# Patient Record
Sex: FEMALE | Race: WHITE | NOT HISPANIC OR LATINO | Employment: OTHER | URBAN - NONMETROPOLITAN AREA
[De-identification: names, ages, dates, MRNs, and addresses within clinical notes are randomized per-mention and may not be internally consistent; named-entity substitution may affect disease eponyms.]

---

## 2017-10-27 ENCOUNTER — TRANSCRIBE ORDERS (OUTPATIENT)
Dept: ADMINISTRATIVE | Facility: HOSPITAL | Age: 62
End: 2017-10-27

## 2017-10-27 DIAGNOSIS — Z12.39 SCREENING BREAST EXAMINATION: Primary | ICD-10-CM

## 2017-11-21 ENCOUNTER — HOSPITAL ENCOUNTER (OUTPATIENT)
Dept: MAMMOGRAPHY | Facility: HOSPITAL | Age: 62
Discharge: HOME | End: 2017-11-21
Admitting: ADVANCED PRACTICE MIDWIFE

## 2017-11-21 DIAGNOSIS — Z12.39 SCREENING BREAST EXAMINATION: ICD-10-CM

## 2017-11-21 PROCEDURE — 77063 BREAST TOMOSYNTHESIS BI: CPT | Performed by: RADIOLOGY

## 2017-11-21 PROCEDURE — 77063 BREAST TOMOSYNTHESIS BI: CPT

## 2017-11-21 PROCEDURE — 77067 SCR MAMMO BI INCL CAD: CPT | Performed by: RADIOLOGY

## 2017-11-21 PROCEDURE — G0202 SCR MAMMO BI INCL CAD: HCPCS

## 2018-06-13 ENCOUNTER — TRANSCRIBE ORDERS (OUTPATIENT)
Dept: ADMINISTRATIVE | Facility: HOSPITAL | Age: 63
End: 2018-06-13

## 2018-06-13 DIAGNOSIS — R60.0 EDEMA EXTREMITIES: Primary | ICD-10-CM

## 2018-06-19 ENCOUNTER — HOSPITAL ENCOUNTER (OUTPATIENT)
Dept: CARDIOLOGY | Facility: HOSPITAL | Age: 63
Discharge: HOME | End: 2018-06-19
Admitting: NURSE PRACTITIONER

## 2018-06-19 DIAGNOSIS — R60.0 EDEMA EXTREMITIES: ICD-10-CM

## 2018-06-19 PROCEDURE — 93925 LOWER EXTREMITY STUDY: CPT | Performed by: RADIOLOGY

## 2018-06-19 PROCEDURE — 93923 UPR/LXTR ART STDY 3+ LVLS: CPT

## 2019-01-11 ENCOUNTER — HOSPITAL ENCOUNTER (OUTPATIENT)
Dept: BONE DENSITY | Facility: HOSPITAL | Age: 64
Discharge: HOME | End: 2019-01-11
Admitting: ADVANCED PRACTICE MIDWIFE

## 2019-01-11 ENCOUNTER — HOSPITAL ENCOUNTER (OUTPATIENT)
Dept: MAMMOGRAPHY | Facility: HOSPITAL | Age: 64
Discharge: HOME | End: 2019-01-11

## 2019-01-11 DIAGNOSIS — Z12.39 SCREENING BREAST EXAMINATION: ICD-10-CM

## 2019-01-11 DIAGNOSIS — Z01.419 WELL WOMAN EXAM: ICD-10-CM

## 2019-01-11 PROCEDURE — 77067 SCR MAMMO BI INCL CAD: CPT

## 2019-01-11 PROCEDURE — 77063 BREAST TOMOSYNTHESIS BI: CPT

## 2019-01-11 PROCEDURE — 77063 BREAST TOMOSYNTHESIS BI: CPT | Performed by: RADIOLOGY

## 2019-01-11 PROCEDURE — 77080 DXA BONE DENSITY AXIAL: CPT | Performed by: RADIOLOGY

## 2019-01-11 PROCEDURE — 77067 SCR MAMMO BI INCL CAD: CPT | Performed by: RADIOLOGY

## 2019-01-11 PROCEDURE — 77080 DXA BONE DENSITY AXIAL: CPT

## 2019-05-03 ENCOUNTER — APPOINTMENT (OUTPATIENT)
Dept: CT IMAGING | Facility: HOSPITAL | Age: 64
End: 2019-05-03

## 2019-05-03 ENCOUNTER — HOSPITAL ENCOUNTER (EMERGENCY)
Facility: HOSPITAL | Age: 64
Discharge: HOME | End: 2019-05-03
Attending: EMERGENCY MEDICINE | Admitting: EMERGENCY MEDICINE

## 2019-05-03 ENCOUNTER — APPOINTMENT (OUTPATIENT)
Dept: GENERAL RADIOLOGY | Facility: HOSPITAL | Age: 64
End: 2019-05-03

## 2019-05-03 VITALS
OXYGEN SATURATION: 99 % | HEIGHT: 60 IN | TEMPERATURE: 98.9 F | BODY MASS INDEX: 40.25 KG/M2 | HEART RATE: 74 BPM | SYSTOLIC BLOOD PRESSURE: 154 MMHG | DIASTOLIC BLOOD PRESSURE: 63 MMHG | RESPIRATION RATE: 16 BRPM | WEIGHT: 205 LBS

## 2019-05-03 DIAGNOSIS — I10 ESSENTIAL HYPERTENSION: Primary | ICD-10-CM

## 2019-05-03 LAB
ALBUMIN SERPL-MCNC: 4.2 G/DL (ref 3.5–5.2)
ALBUMIN/GLOB SERPL: 1.2 G/DL
ALP SERPL-CCNC: 84 U/L (ref 39–117)
ALT SERPL W P-5'-P-CCNC: 24 U/L (ref 1–33)
ANION GAP SERPL CALCULATED.3IONS-SCNC: 16.9 MMOL/L
AST SERPL-CCNC: 20 U/L (ref 1–32)
BASOPHILS # BLD AUTO: 0.03 10*3/MM3 (ref 0–0.2)
BASOPHILS NFR BLD AUTO: 0.3 % (ref 0–1.5)
BILIRUB SERPL-MCNC: 0.2 MG/DL (ref 0.2–1.2)
BUN BLD-MCNC: 11 MG/DL (ref 8–23)
BUN/CREAT SERPL: 16.4 (ref 7–25)
CALCIUM SPEC-SCNC: 9.2 MG/DL (ref 8.6–10.5)
CHLORIDE SERPL-SCNC: 99 MMOL/L (ref 98–107)
CO2 SERPL-SCNC: 25.1 MMOL/L (ref 22–29)
CREAT BLD-MCNC: 0.67 MG/DL (ref 0.57–1)
DEPRECATED RDW RBC AUTO: 50.7 FL (ref 37–54)
EOSINOPHIL # BLD AUTO: 0.22 10*3/MM3 (ref 0–0.4)
EOSINOPHIL NFR BLD AUTO: 1.9 % (ref 0.3–6.2)
ERYTHROCYTE [DISTWIDTH] IN BLOOD BY AUTOMATED COUNT: 15.9 % (ref 12.3–15.4)
GFR SERPL CREATININE-BSD FRML MDRD: 89 ML/MIN/1.73
GLOBULIN UR ELPH-MCNC: 3.4 GM/DL
GLUCOSE BLD-MCNC: 128 MG/DL (ref 65–99)
HCT VFR BLD AUTO: 39.8 % (ref 34–46.6)
HGB BLD-MCNC: 12.1 G/DL (ref 12–15.9)
HOLD SPECIMEN: NORMAL
HOLD SPECIMEN: NORMAL
IMM GRANULOCYTES # BLD AUTO: 0.03 10*3/MM3 (ref 0–0.05)
IMM GRANULOCYTES NFR BLD AUTO: 0.3 % (ref 0–0.5)
LYMPHOCYTES # BLD AUTO: 1.26 10*3/MM3 (ref 0.7–3.1)
LYMPHOCYTES NFR BLD AUTO: 11.1 % (ref 19.6–45.3)
MCH RBC QN AUTO: 26.9 PG (ref 26.6–33)
MCHC RBC AUTO-ENTMCNC: 30.4 G/DL (ref 31.5–35.7)
MCV RBC AUTO: 88.6 FL (ref 79–97)
MONOCYTES # BLD AUTO: 0.77 10*3/MM3 (ref 0.1–0.9)
MONOCYTES NFR BLD AUTO: 6.8 % (ref 5–12)
NEUTROPHILS # BLD AUTO: 9.03 10*3/MM3 (ref 1.7–7)
NEUTROPHILS NFR BLD AUTO: 79.6 % (ref 42.7–76)
PLATELET # BLD AUTO: 383 10*3/MM3 (ref 140–450)
PMV BLD AUTO: 8.9 FL (ref 6–12)
POTASSIUM BLD-SCNC: 3.6 MMOL/L (ref 3.5–5.2)
PROT SERPL-MCNC: 7.6 G/DL (ref 6–8.5)
RBC # BLD AUTO: 4.49 10*6/MM3 (ref 3.77–5.28)
SODIUM BLD-SCNC: 141 MMOL/L (ref 136–145)
WBC NRBC COR # BLD: 11.34 10*3/MM3 (ref 3.4–10.8)
WHOLE BLOOD HOLD SPECIMEN: NORMAL
WHOLE BLOOD HOLD SPECIMEN: NORMAL

## 2019-05-03 PROCEDURE — 80053 COMPREHEN METABOLIC PANEL: CPT | Performed by: EMERGENCY MEDICINE

## 2019-05-03 PROCEDURE — 93005 ELECTROCARDIOGRAM TRACING: CPT | Performed by: EMERGENCY MEDICINE

## 2019-05-03 PROCEDURE — 94799 UNLISTED PULMONARY SVC/PX: CPT

## 2019-05-03 PROCEDURE — 25010000002 DEXAMETHASONE PER 1 MG: Performed by: EMERGENCY MEDICINE

## 2019-05-03 PROCEDURE — 71046 X-RAY EXAM CHEST 2 VIEWS: CPT

## 2019-05-03 PROCEDURE — 71260 CT THORAX DX C+: CPT

## 2019-05-03 PROCEDURE — 71046 X-RAY EXAM CHEST 2 VIEWS: CPT | Performed by: RADIOLOGY

## 2019-05-03 PROCEDURE — 94640 AIRWAY INHALATION TREATMENT: CPT

## 2019-05-03 PROCEDURE — 0 IOVERSOL 68 % SOLUTION: Performed by: EMERGENCY MEDICINE

## 2019-05-03 PROCEDURE — 85025 COMPLETE CBC W/AUTO DIFF WBC: CPT | Performed by: EMERGENCY MEDICINE

## 2019-05-03 PROCEDURE — 93010 ELECTROCARDIOGRAM REPORT: CPT | Performed by: INTERNAL MEDICINE

## 2019-05-03 PROCEDURE — 99283 EMERGENCY DEPT VISIT LOW MDM: CPT

## 2019-05-03 PROCEDURE — 96374 THER/PROPH/DIAG INJ IV PUSH: CPT

## 2019-05-03 PROCEDURE — 71260 CT THORAX DX C+: CPT | Performed by: RADIOLOGY

## 2019-05-03 RX ORDER — DEXLANSOPRAZOLE 60 MG/1
60 CAPSULE, DELAYED RELEASE ORAL DAILY
COMMUNITY
End: 2023-04-03 | Stop reason: SDUPTHER

## 2019-05-03 RX ORDER — DEXAMETHASONE SODIUM PHOSPHATE 4 MG/ML
4 INJECTION, SOLUTION INTRA-ARTICULAR; INTRALESIONAL; INTRAMUSCULAR; INTRAVENOUS; SOFT TISSUE ONCE
Status: COMPLETED | OUTPATIENT
Start: 2019-05-03 | End: 2019-05-03

## 2019-05-03 RX ORDER — ALBUTEROL SULFATE 2.5 MG/3ML
2.5 SOLUTION RESPIRATORY (INHALATION) ONCE
Status: COMPLETED | OUTPATIENT
Start: 2019-05-03 | End: 2019-05-03

## 2019-05-03 RX ORDER — ASPIRIN 81 MG/1
81 TABLET, CHEWABLE ORAL DAILY
COMMUNITY

## 2019-05-03 RX ORDER — BISOPROLOL FUMARATE AND HYDROCHLOROTHIAZIDE 5; 6.25 MG/1; MG/1
1 TABLET ORAL DAILY
COMMUNITY
End: 2021-04-14

## 2019-05-03 RX ADMIN — DEXAMETHASONE SODIUM PHOSPHATE 4 MG: 4 INJECTION, SOLUTION INTRA-ARTICULAR; INTRALESIONAL; INTRAMUSCULAR; INTRAVENOUS; SOFT TISSUE at 18:39

## 2019-05-03 RX ADMIN — IOVERSOL 90 ML: 678 INJECTION INTRA-ARTERIAL; INTRAVENOUS at 21:05

## 2019-05-03 RX ADMIN — ALBUTEROL SULFATE 2.5 MG: 2.5 SOLUTION RESPIRATORY (INHALATION) at 19:04

## 2019-05-04 NOTE — ED PROVIDER NOTES
Subjective   Patient presents to ER with shortness of breath        Shortness of Breath   Severity:  Mild  Onset quality:  Gradual  Timing:  Constant  Progression:  Worsening  Context: activity, pollens and weather changes    Relieved by:  Nothing  Worsened by:  Exertion and movement      Review of Systems   Constitutional: Positive for activity change and fatigue.   HENT: Negative.    Eyes: Negative.    Respiratory: Positive for shortness of breath.    Cardiovascular: Negative.    Gastrointestinal: Negative.    Endocrine: Negative.    Genitourinary: Negative.    Musculoskeletal: Negative.    Skin: Negative.    Allergic/Immunologic: Negative.    Neurological: Negative.    Hematological: Negative.    Psychiatric/Behavioral: Negative.        Past Medical History:   Diagnosis Date   • Asthma    • Hypertension        Allergies   Allergen Reactions   • Erythromycin GI Intolerance       Past Surgical History:   Procedure Laterality Date   • HYSTERECTOMY         Family History   Problem Relation Age of Onset   • Breast cancer Neg Hx        Social History     Socioeconomic History   • Marital status:      Spouse name: Not on file   • Number of children: Not on file   • Years of education: Not on file   • Highest education level: Not on file   Tobacco Use   • Smoking status: Former Smoker   • Smokeless tobacco: Never Used   Substance and Sexual Activity   • Alcohol use: No     Frequency: Never   • Drug use: No           Objective   Physical Exam   Constitutional: She appears well-developed and well-nourished.   HENT:   Head: Normocephalic and atraumatic.   Mouth/Throat: Oropharynx is clear and moist.   Eyes: Pupils are equal, round, and reactive to light.   Neck: Normal range of motion.   Cardiovascular: Normal rate.   Pulmonary/Chest: Effort normal and breath sounds normal.   Abdominal: Soft.   Musculoskeletal: Normal range of motion.        Right lower leg: Normal. She exhibits edema.        Left lower leg: Normal.  She exhibits edema.   Neurological: She is alert.   Skin: Skin is warm and dry. Capillary refill takes less than 2 seconds.   Psychiatric: She has a normal mood and affect.   Nursing note and vitals reviewed.      Procedures           ED Course  ED Course as of May 03 2218   Fri May 03, 2019   2216 CT Chest 1x1.5 cm nodule right thyroid- Patient advised to follow-up with PCP and consider ultrasound.Probable granuloma left lung base, Hiatal hernia.  [ZAIRA]      ED Course User Index  [ZAIRA] Vu Gallegos MD                  [facility]      Final diagnoses:   Essential hypertension            Vu Gallegos MD  05/03/19 2205       Vu Gallegos MD  05/03/19 2218

## 2019-05-14 ENCOUNTER — TRANSCRIBE ORDERS (OUTPATIENT)
Dept: ADMINISTRATIVE | Facility: HOSPITAL | Age: 64
End: 2019-05-14

## 2019-05-14 DIAGNOSIS — E04.1 THYROID NODULE: Primary | ICD-10-CM

## 2019-05-16 ENCOUNTER — HOSPITAL ENCOUNTER (OUTPATIENT)
Dept: ULTRASOUND IMAGING | Facility: HOSPITAL | Age: 64
Discharge: HOME | End: 2019-05-16
Admitting: NURSE PRACTITIONER

## 2019-05-16 DIAGNOSIS — E04.1 THYROID NODULE: ICD-10-CM

## 2019-05-16 PROCEDURE — 76536 US EXAM OF HEAD AND NECK: CPT | Performed by: RADIOLOGY

## 2019-05-16 PROCEDURE — 76536 US EXAM OF HEAD AND NECK: CPT

## 2019-11-13 ENCOUNTER — TRANSCRIBE ORDERS (OUTPATIENT)
Dept: OTHER | Facility: OTHER | Age: 64
End: 2019-11-13

## 2019-11-13 ENCOUNTER — HOSPITAL ENCOUNTER (OUTPATIENT)
Dept: CT IMAGING | Facility: HOSPITAL | Age: 64
Discharge: HOME | End: 2019-11-13
Admitting: NURSE PRACTITIONER

## 2019-11-13 ENCOUNTER — LAB (OUTPATIENT)
Dept: LAB | Facility: HOSPITAL | Age: 64
End: 2019-11-13

## 2019-11-13 DIAGNOSIS — F05 VASCULAR DEMENTIA WITH DELIRIUM (HCC): ICD-10-CM

## 2019-11-13 DIAGNOSIS — R11.0 NAUSEA: ICD-10-CM

## 2019-11-13 DIAGNOSIS — R42 DIZZINESS AND GIDDINESS: ICD-10-CM

## 2019-11-13 DIAGNOSIS — R41.0 CONFUSION: Primary | ICD-10-CM

## 2019-11-13 DIAGNOSIS — R11.0 NAUSEA: Primary | ICD-10-CM

## 2019-11-13 DIAGNOSIS — R79.89 POSITIVE D-DIMER: Primary | ICD-10-CM

## 2019-11-13 DIAGNOSIS — R41.0 CONFUSION: ICD-10-CM

## 2019-11-13 DIAGNOSIS — F01.50 VASCULAR DEMENTIA WITH DELIRIUM (HCC): ICD-10-CM

## 2019-11-13 LAB
ALBUMIN SERPL-MCNC: 4.2 G/DL (ref 3.5–5.2)
ALBUMIN/GLOB SERPL: 1.3 G/DL
ALP SERPL-CCNC: 83 U/L (ref 39–117)
ALT SERPL W P-5'-P-CCNC: 28 U/L (ref 1–33)
ANION GAP SERPL CALCULATED.3IONS-SCNC: 12.6 MMOL/L (ref 5–15)
AST SERPL-CCNC: 27 U/L (ref 1–32)
BASOPHILS # BLD AUTO: 0.03 10*3/MM3 (ref 0–0.2)
BASOPHILS NFR BLD AUTO: 0.4 % (ref 0–1.5)
BILIRUB SERPL-MCNC: 0.2 MG/DL (ref 0.2–1.2)
BUN BLD-MCNC: 9 MG/DL (ref 8–23)
BUN/CREAT SERPL: 16.1 (ref 7–25)
CALCIUM SPEC-SCNC: 9.3 MG/DL (ref 8.6–10.5)
CHLORIDE SERPL-SCNC: 100 MMOL/L (ref 98–107)
CHOLEST SERPL-MCNC: 171 MG/DL (ref 0–200)
CO2 SERPL-SCNC: 29.4 MMOL/L (ref 22–29)
CREAT BLD-MCNC: 0.56 MG/DL (ref 0.57–1)
D DIMER PPP FEU-MCNC: 0.51 MCGFEU/ML (ref 0–0.5)
DEPRECATED RDW RBC AUTO: 50.5 FL (ref 37–54)
EOSINOPHIL # BLD AUTO: 0.09 10*3/MM3 (ref 0–0.4)
EOSINOPHIL NFR BLD AUTO: 1.2 % (ref 0.3–6.2)
ERYTHROCYTE [DISTWIDTH] IN BLOOD BY AUTOMATED COUNT: 15.9 % (ref 12.3–15.4)
GFR SERPL CREATININE-BSD FRML MDRD: 109 ML/MIN/1.73
GLOBULIN UR ELPH-MCNC: 3.3 GM/DL
GLUCOSE BLD-MCNC: 140 MG/DL (ref 65–99)
HBA1C MFR BLD: 6.5 % (ref 4.8–5.6)
HCT VFR BLD AUTO: 38.5 % (ref 34–46.6)
HDLC SERPL-MCNC: 47 MG/DL (ref 40–60)
HGB BLD-MCNC: 11.8 G/DL (ref 12–15.9)
IMM GRANULOCYTES # BLD AUTO: 0.05 10*3/MM3 (ref 0–0.05)
IMM GRANULOCYTES NFR BLD AUTO: 0.7 % (ref 0–0.5)
LDLC SERPL CALC-MCNC: 101 MG/DL (ref 0–100)
LDLC/HDLC SERPL: 2.14 {RATIO}
LYMPHOCYTES # BLD AUTO: 0.87 10*3/MM3 (ref 0.7–3.1)
LYMPHOCYTES NFR BLD AUTO: 11.6 % (ref 19.6–45.3)
MAGNESIUM SERPL-MCNC: 1.7 MG/DL (ref 1.6–2.4)
MCH RBC QN AUTO: 26.5 PG (ref 26.6–33)
MCHC RBC AUTO-ENTMCNC: 30.6 G/DL (ref 31.5–35.7)
MCV RBC AUTO: 86.3 FL (ref 79–97)
MONOCYTES # BLD AUTO: 0.44 10*3/MM3 (ref 0.1–0.9)
MONOCYTES NFR BLD AUTO: 5.9 % (ref 5–12)
NEUTROPHILS # BLD AUTO: 6.03 10*3/MM3 (ref 1.7–7)
NEUTROPHILS NFR BLD AUTO: 80.2 % (ref 42.7–76)
NRBC BLD AUTO-RTO: 0 /100 WBC (ref 0–0.2)
PLATELET # BLD AUTO: 302 10*3/MM3 (ref 140–450)
PMV BLD AUTO: 8.9 FL (ref 6–12)
POTASSIUM BLD-SCNC: 3.2 MMOL/L (ref 3.5–5.2)
PROT SERPL-MCNC: 7.5 G/DL (ref 6–8.5)
RBC # BLD AUTO: 4.46 10*6/MM3 (ref 3.77–5.28)
SODIUM BLD-SCNC: 142 MMOL/L (ref 136–145)
TRIGL SERPL-MCNC: 116 MG/DL (ref 0–150)
TSH SERPL DL<=0.05 MIU/L-ACNC: 2.15 UIU/ML (ref 0.27–4.2)
VLDLC SERPL-MCNC: 23.2 MG/DL
WBC NRBC COR # BLD: 7.51 10*3/MM3 (ref 3.4–10.8)

## 2019-11-13 PROCEDURE — 83036 HEMOGLOBIN GLYCOSYLATED A1C: CPT

## 2019-11-13 PROCEDURE — 80061 LIPID PANEL: CPT

## 2019-11-13 PROCEDURE — 70450 CT HEAD/BRAIN W/O DYE: CPT

## 2019-11-13 PROCEDURE — 85379 FIBRIN DEGRADATION QUANT: CPT

## 2019-11-13 PROCEDURE — 70450 CT HEAD/BRAIN W/O DYE: CPT | Performed by: RADIOLOGY

## 2019-11-13 PROCEDURE — 83735 ASSAY OF MAGNESIUM: CPT

## 2019-11-13 PROCEDURE — 84480 ASSAY TRIIODOTHYRONINE (T3): CPT

## 2019-11-13 PROCEDURE — 85025 COMPLETE CBC W/AUTO DIFF WBC: CPT

## 2019-11-13 PROCEDURE — 84436 ASSAY OF TOTAL THYROXINE: CPT

## 2019-11-13 PROCEDURE — 36415 COLL VENOUS BLD VENIPUNCTURE: CPT

## 2019-11-13 PROCEDURE — 84443 ASSAY THYROID STIM HORMONE: CPT

## 2019-11-13 PROCEDURE — 80053 COMPREHEN METABOLIC PANEL: CPT

## 2019-11-14 ENCOUNTER — HOSPITAL ENCOUNTER (OUTPATIENT)
Dept: CT IMAGING | Facility: HOSPITAL | Age: 64
Discharge: HOME | End: 2019-11-14
Admitting: NURSE PRACTITIONER

## 2019-11-14 DIAGNOSIS — R79.89 POSITIVE D-DIMER: ICD-10-CM

## 2019-11-14 LAB
T3 SERPL-MCNC: 121 NG/DL (ref 80–200)
T4 SERPL-MCNC: 7.54 MCG/DL (ref 4.5–11.7)

## 2019-11-14 PROCEDURE — 71275 CT ANGIOGRAPHY CHEST: CPT

## 2019-11-14 PROCEDURE — 71275 CT ANGIOGRAPHY CHEST: CPT | Performed by: RADIOLOGY

## 2019-11-14 PROCEDURE — 0 IOVERSOL 74 % SOLUTION: Performed by: NURSE PRACTITIONER

## 2019-11-14 RX ADMIN — IOVERSOL 100 ML: 741 INJECTION INTRA-ARTERIAL; INTRAVENOUS at 09:38

## 2019-11-29 ENCOUNTER — TRANSCRIBE ORDERS (OUTPATIENT)
Dept: OTHER | Facility: OTHER | Age: 64
End: 2019-11-29

## 2019-11-29 ENCOUNTER — TRANSCRIBE ORDERS (OUTPATIENT)
Dept: ADMINISTRATIVE | Facility: HOSPITAL | Age: 64
End: 2019-11-29

## 2019-11-29 DIAGNOSIS — R42 DIZZINESS: Primary | ICD-10-CM

## 2019-11-29 DIAGNOSIS — G45.9 TIA (TRANSIENT ISCHEMIC ATTACK): ICD-10-CM

## 2019-12-06 ENCOUNTER — HOSPITAL ENCOUNTER (OUTPATIENT)
Dept: CARDIOLOGY | Facility: HOSPITAL | Age: 64
Discharge: HOME | End: 2019-12-06

## 2019-12-06 ENCOUNTER — HOSPITAL ENCOUNTER (OUTPATIENT)
Dept: CARDIOLOGY | Facility: HOSPITAL | Age: 64
Discharge: HOME | End: 2019-12-06
Admitting: INTERNAL MEDICINE

## 2019-12-06 DIAGNOSIS — R42 DIZZINESS: ICD-10-CM

## 2019-12-06 DIAGNOSIS — G45.9 TIA (TRANSIENT ISCHEMIC ATTACK): ICD-10-CM

## 2019-12-06 LAB
BH CV ECHO MEAS - ACS: 1.8 CM
BH CV ECHO MEAS - AO MAX PG: 9.4 MMHG
BH CV ECHO MEAS - AO MEAN PG: 4.3 MMHG
BH CV ECHO MEAS - AO ROOT AREA (BSA CORRECTED): 1.6
BH CV ECHO MEAS - AO ROOT AREA: 6.8 CM^2
BH CV ECHO MEAS - AO ROOT DIAM: 2.9 CM
BH CV ECHO MEAS - AO V2 MAX: 153.4 CM/SEC
BH CV ECHO MEAS - AO V2 MEAN: 95 CM/SEC
BH CV ECHO MEAS - AO V2 VTI: 35.8 CM
BH CV ECHO MEAS - BSA(HAYCOCK): 2 M^2
BH CV ECHO MEAS - BSA: 1.9 M^2
BH CV ECHO MEAS - BZI_BMI: 40 KILOGRAMS/M^2
BH CV ECHO MEAS - BZI_METRIC_HEIGHT: 152.4 CM
BH CV ECHO MEAS - BZI_METRIC_WEIGHT: 93 KG
BH CV ECHO MEAS - EDV(CUBED): 68.6 ML
BH CV ECHO MEAS - EDV(MOD-SP4): 56 ML
BH CV ECHO MEAS - EDV(TEICH): 73.9 ML
BH CV ECHO MEAS - EF(CUBED): 73.2 %
BH CV ECHO MEAS - EF(MOD-SP4): 67.9 %
BH CV ECHO MEAS - EF(TEICH): 65.5 %
BH CV ECHO MEAS - ESV(CUBED): 18.4 ML
BH CV ECHO MEAS - ESV(MOD-SP4): 18 ML
BH CV ECHO MEAS - ESV(TEICH): 25.5 ML
BH CV ECHO MEAS - FS: 35.5 %
BH CV ECHO MEAS - IVS/LVPW: 1
BH CV ECHO MEAS - IVSD: 0.97 CM
BH CV ECHO MEAS - LA DIMENSION: 3.6 CM
BH CV ECHO MEAS - LA/AO: 1.2
BH CV ECHO MEAS - LV DIASTOLIC VOL/BSA (35-75): 29.7 ML/M^2
BH CV ECHO MEAS - LV MASS(C)D: 124.7 GRAMS
BH CV ECHO MEAS - LV MASS(C)DI: 66.1 GRAMS/M^2
BH CV ECHO MEAS - LV SYSTOLIC VOL/BSA (12-30): 9.5 ML/M^2
BH CV ECHO MEAS - LVIDD: 4.1 CM
BH CV ECHO MEAS - LVIDS: 2.6 CM
BH CV ECHO MEAS - LVLD AP4: 5.6 CM
BH CV ECHO MEAS - LVLS AP4: 4.6 CM
BH CV ECHO MEAS - LVOT AREA (M): 2.3 CM^2
BH CV ECHO MEAS - LVOT AREA: 2.2 CM^2
BH CV ECHO MEAS - LVOT DIAM: 1.7 CM
BH CV ECHO MEAS - LVPWD: 0.95 CM
BH CV ECHO MEAS - MV A MAX VEL: 103.7 CM/SEC
BH CV ECHO MEAS - MV E MAX VEL: 77.5 CM/SEC
BH CV ECHO MEAS - MV E/A: 0.75
BH CV ECHO MEAS - PA ACC SLOPE: 1371 CM/SEC^2
BH CV ECHO MEAS - PA ACC TIME: 0.1 SEC
BH CV ECHO MEAS - PA PR(ACCEL): 36.2 MMHG
BH CV ECHO MEAS - RAP SYSTOLE: 10 MMHG
BH CV ECHO MEAS - RVSP: 45.7 MMHG
BH CV ECHO MEAS - SI(AO): 129.6 ML/M^2
BH CV ECHO MEAS - SI(CUBED): 26.6 ML/M^2
BH CV ECHO MEAS - SI(MOD-SP4): 20.1 ML/M^2
BH CV ECHO MEAS - SI(TEICH): 25.7 ML/M^2
BH CV ECHO MEAS - SV(AO): 244.4 ML
BH CV ECHO MEAS - SV(CUBED): 50.2 ML
BH CV ECHO MEAS - SV(MOD-SP4): 38 ML
BH CV ECHO MEAS - SV(TEICH): 48.4 ML
BH CV ECHO MEAS - TR MAX VEL: 298.6 CM/SEC

## 2019-12-06 PROCEDURE — 93880 EXTRACRANIAL BILAT STUDY: CPT | Performed by: RADIOLOGY

## 2019-12-06 PROCEDURE — 93306 TTE W/DOPPLER COMPLETE: CPT | Performed by: INTERNAL MEDICINE

## 2019-12-06 PROCEDURE — 93306 TTE W/DOPPLER COMPLETE: CPT

## 2019-12-06 PROCEDURE — 93880 EXTRACRANIAL BILAT STUDY: CPT

## 2020-01-21 ENCOUNTER — APPOINTMENT (OUTPATIENT)
Dept: MAMMOGRAPHY | Facility: HOSPITAL | Age: 65
End: 2020-01-21

## 2020-02-13 ENCOUNTER — HOSPITAL ENCOUNTER (OUTPATIENT)
Dept: MAMMOGRAPHY | Facility: HOSPITAL | Age: 65
Discharge: HOME | End: 2020-02-13
Admitting: ADVANCED PRACTICE MIDWIFE

## 2020-02-13 DIAGNOSIS — Z12.31 VISIT FOR SCREENING MAMMOGRAM: ICD-10-CM

## 2020-02-13 PROCEDURE — 77063 BREAST TOMOSYNTHESIS BI: CPT

## 2020-02-13 PROCEDURE — 77067 SCR MAMMO BI INCL CAD: CPT | Performed by: RADIOLOGY

## 2020-02-13 PROCEDURE — 77067 SCR MAMMO BI INCL CAD: CPT

## 2020-02-13 PROCEDURE — 77063 BREAST TOMOSYNTHESIS BI: CPT | Performed by: RADIOLOGY

## 2020-03-20 ENCOUNTER — TRANSCRIBE ORDERS (OUTPATIENT)
Dept: ADMINISTRATIVE | Facility: HOSPITAL | Age: 65
End: 2020-03-20

## 2020-03-20 ENCOUNTER — LAB (OUTPATIENT)
Dept: LAB | Facility: HOSPITAL | Age: 65
End: 2020-03-20

## 2020-03-20 DIAGNOSIS — J40 BRONCHITIS, NOT SPECIFIED AS ACUTE OR CHRONIC: ICD-10-CM

## 2020-03-20 DIAGNOSIS — J40 BRONCHITIS, NOT SPECIFIED AS ACUTE OR CHRONIC: Primary | ICD-10-CM

## 2020-03-20 PROCEDURE — 87635 SARS-COV-2 COVID-19 AMP PRB: CPT

## 2020-03-20 PROCEDURE — U0003 INFECTIOUS AGENT DETECTION BY NUCLEIC ACID (DNA OR RNA); SEVERE ACUTE RESPIRATORY SYNDROME CORONAVIRUS 2 (SARS-COV-2) (CORONAVIRUS DISEASE [COVID-19]), AMPLIFIED PROBE TECHNIQUE, MAKING USE OF HIGH THROUGHPUT TECHNOLOGIES AS DESCRIBED BY CMS-2020-01-R: HCPCS

## 2020-03-31 LAB — SARS-COV-2 RNA RESP QL NAA+PROBE: NOT DETECTED

## 2021-02-15 RX ORDER — LEVOTHYROXINE SODIUM 50 MCG
TABLET ORAL
Qty: 30 TABLET | Refills: 1 | Status: SHIPPED | OUTPATIENT
Start: 2021-02-15 | End: 2021-03-15

## 2021-02-22 DIAGNOSIS — Z23 IMMUNIZATION DUE: ICD-10-CM

## 2021-03-03 ENCOUNTER — HOSPITAL ENCOUNTER (OUTPATIENT)
Dept: HOSPITAL 79 - LAB | Age: 66
End: 2021-03-03
Attending: NURSE PRACTITIONER
Payer: MEDICARE

## 2021-03-03 DIAGNOSIS — R76.8: ICD-10-CM

## 2021-03-03 DIAGNOSIS — M19.90: ICD-10-CM

## 2021-03-03 DIAGNOSIS — M81.0: ICD-10-CM

## 2021-03-03 DIAGNOSIS — M25.50: ICD-10-CM

## 2021-03-03 DIAGNOSIS — D89.9: Primary | ICD-10-CM

## 2021-03-03 LAB
BUN/CREATININE RATIO: 22 (ref 0–10)
HGB BLD-MCNC: 12.6 GM/DL (ref 12.3–15.3)
RED BLOOD COUNT: 4.62 M/UL (ref 4–5.1)
WHITE BLOOD COUNT: 7.7 K/UL (ref 4.5–11)

## 2021-03-04 LAB
RHEUMATOID ARTHRITIS FACTOR: <10 IU/ML (ref 0–13.9)
VITAMIN D, 25-HYDROXY: 42.5 NG/ML (ref 30–100)

## 2021-03-05 ENCOUNTER — IMMUNIZATION (OUTPATIENT)
Dept: VACCINE CLINIC | Facility: HOSPITAL | Age: 66
End: 2021-03-05

## 2021-03-05 LAB — CCP ANTIBODIES IGG/IGA: 4 UNITS (ref 0–19)

## 2021-03-05 PROCEDURE — 91300 HC SARSCOV02 VAC 30MCG/0.3ML IM: CPT | Performed by: INTERNAL MEDICINE

## 2021-03-05 PROCEDURE — 0001A: CPT | Performed by: INTERNAL MEDICINE

## 2021-03-15 RX ORDER — LEVOTHYROXINE SODIUM 50 MCG
TABLET ORAL
Qty: 30 TABLET | Refills: 1 | Status: SHIPPED | OUTPATIENT
Start: 2021-03-15 | End: 2021-04-16 | Stop reason: SDUPTHER

## 2021-03-26 ENCOUNTER — IMMUNIZATION (OUTPATIENT)
Dept: VACCINE CLINIC | Facility: HOSPITAL | Age: 66
End: 2021-03-26

## 2021-03-26 PROCEDURE — 0002A: CPT | Performed by: INTERNAL MEDICINE

## 2021-03-26 PROCEDURE — 91300 HC SARSCOV02 VAC 30MCG/0.3ML IM: CPT | Performed by: INTERNAL MEDICINE

## 2021-04-14 ENCOUNTER — OFFICE VISIT (OUTPATIENT)
Dept: ENDOCRINOLOGY | Facility: CLINIC | Age: 66
End: 2021-04-14

## 2021-04-14 ENCOUNTER — LAB (OUTPATIENT)
Dept: LAB | Facility: HOSPITAL | Age: 66
End: 2021-04-14

## 2021-04-14 VITALS
BODY MASS INDEX: 39.66 KG/M2 | SYSTOLIC BLOOD PRESSURE: 152 MMHG | OXYGEN SATURATION: 95 % | HEART RATE: 74 BPM | HEIGHT: 60 IN | WEIGHT: 202 LBS | TEMPERATURE: 97.1 F | DIASTOLIC BLOOD PRESSURE: 94 MMHG

## 2021-04-14 DIAGNOSIS — E03.9 ACQUIRED HYPOTHYROIDISM: Primary | ICD-10-CM

## 2021-04-14 DIAGNOSIS — E03.9 ACQUIRED HYPOTHYROIDISM: ICD-10-CM

## 2021-04-14 DIAGNOSIS — E04.1 THYROID CYST: ICD-10-CM

## 2021-04-14 LAB — TSH SERPL DL<=0.05 MIU/L-ACNC: 1.85 UIU/ML (ref 0.27–4.2)

## 2021-04-14 PROCEDURE — 84443 ASSAY THYROID STIM HORMONE: CPT

## 2021-04-14 PROCEDURE — 99214 OFFICE O/P EST MOD 30 MIN: CPT | Performed by: INTERNAL MEDICINE

## 2021-04-14 RX ORDER — ALBUTEROL SULFATE 90 UG/1
1 AEROSOL, METERED RESPIRATORY (INHALATION) AS NEEDED
COMMUNITY
Start: 2021-01-15

## 2021-04-14 RX ORDER — CYCLOSPORINE 0.5 MG/ML
1 EMULSION OPHTHALMIC
COMMUNITY
Start: 2021-03-17

## 2021-04-14 RX ORDER — CETIRIZINE HYDROCHLORIDE, PSEUDOEPHEDRINE HYDROCHLORIDE 5; 120 MG/1; MG/1
TABLET, FILM COATED, EXTENDED RELEASE ORAL EVERY 12 HOURS
COMMUNITY
Start: 2019-11-25

## 2021-04-14 RX ORDER — LOSARTAN POTASSIUM 50 MG/1
TABLET ORAL
COMMUNITY
Start: 2020-01-20

## 2021-04-14 RX ORDER — BISOPROLOL FUMARATE AND HYDROCHLOROTHIAZIDE 5; 6.25 MG/1; MG/1
TABLET ORAL
COMMUNITY
Start: 2019-11-25

## 2021-04-14 RX ORDER — DEXLANSOPRAZOLE 60 MG/1
60 CAPSULE, DELAYED RELEASE ORAL DAILY
COMMUNITY
End: 2022-07-26 | Stop reason: SDUPTHER

## 2021-04-14 RX ORDER — LORAZEPAM 0.5 MG/1
0.5 TABLET ORAL AS NEEDED
COMMUNITY
Start: 2021-03-15

## 2021-04-14 NOTE — PROGRESS NOTES
"     Office Note      Date: 2021  Patient Name: Ashely Frederick  MRN: [id number]  : 1955    Chief Complaint   Patient presents with   • Thyroid Problem       History of Present Illness:   Ashely Frederick is a 65 y.o. female who presents for Thyroid Problem  she has hx of mild hypothyroidism and has been on levothyroine 50 mcg per day. I saw her about 18 months ago for this.  She has hx of a 1.7 cm thyroid cyst which was stable based upon 2 ultrasounds.  She notes no changes in her neck.  She reporst no changes to her medical history.    Subjective          Review of Systems:   Review of Systems   Constitutional: Negative.    HENT: Negative.    Eyes: Negative.    Respiratory: Negative.    Cardiovascular: Negative.    Gastrointestinal: Negative.    All other systems reviewed and are negative.      The following portions of the patient's history were reviewed and updated as appropriate: allergies, current medications, past family history, past medical history, past social history, past surgical history and problem list.    Objective     Visit Vitals  /94 (BP Location: Right arm, Patient Position: Sitting, Cuff Size: Adult)   Pulse 74   Temp 97.1 °F (36.2 °C) (Infrared)   Ht 152.4 cm (60\")   Wt 91.6 kg (202 lb)   SpO2 95%   BMI 39.45 kg/m²       Labs:    CBC w/DIFF  Lab Results   Component Value Date    WBC 7.51 2019    RBC 4.46 2019    HGB 11.8 (L) 2019    HCT 38.5 2019    MCV 86.3 2019    MCH 26.5 (L) 2019    MCHC 30.6 (L) 2019    RDW 15.9 (H) 2019    RDWSD 50.5 2019    MPV 8.9 2019     2019    NEUTRORELPCT 80.2 (H) 2019    LYMPHORELPCT 11.6 (L) 2019    MONORELPCT 5.9 2019    EOSRELPCT 1.2 2019    BASORELPCT 0.4 2019    AUTOIGPER 0.7 (H) 2019    NEUTROABS 6.03 2019    LYMPHSABS 0.87 2019    MONOSABS 0.44 2019    EOSABS 0.09 2019    BASOSABS 0.03 2019    AUTOIGNUM " 0.05 11/13/2019    [facility] 0.0 11/13/2019       T4  No results found for: FREET4    TSH  No results found for: TSHBASE     Physical Exam:  Physical Exam  Vitals reviewed.   Constitutional:       Appearance: Normal appearance.   Neck:      Comments: No palpable thyroid abnormality  Lymphadenopathy:      Cervical: No cervical adenopathy.   Neurological:      Mental Status: She is alert and oriented to person, place, and time.   Psychiatric:         Mood and Affect: Mood normal.         Thought Content: Thought content normal.         Judgment: Judgment normal.         Assessment / Plan      Assessment & Plan:  Problem List Items Addressed This Visit        Other    Acquired hypothyroidism - Primary    Current Assessment & Plan     Clinically euthyroid. Will check labs to verify         Relevant Medications    Synthroid 50 MCG tablet    Thyroid cyst    Current Assessment & Plan     Stable  By exam. If it becomes palpable would repeat ultrasound          Relevant Medications    Synthroid 50 MCG tablet           Familia Grover MD   04/14/2021

## 2021-04-16 RX ORDER — LEVOTHYROXINE SODIUM 50 MCG
50 TABLET ORAL DAILY
Qty: 90 TABLET | Refills: 3 | Status: SHIPPED | OUTPATIENT
Start: 2021-04-16 | End: 2022-10-14 | Stop reason: SDUPTHER

## 2021-05-14 ENCOUNTER — APPOINTMENT (OUTPATIENT)
Dept: MAMMOGRAPHY | Facility: HOSPITAL | Age: 66
End: 2021-05-14

## 2021-06-03 ENCOUNTER — HOSPITAL ENCOUNTER (OUTPATIENT)
Dept: HOSPITAL 79 - EXRD | Age: 66
End: 2021-06-03
Attending: INTERNAL MEDICINE
Payer: MEDICARE

## 2021-06-03 DIAGNOSIS — M46.1: Primary | ICD-10-CM

## 2021-06-15 ENCOUNTER — HOSPITAL ENCOUNTER (OUTPATIENT)
Dept: MAMMOGRAPHY | Facility: HOSPITAL | Age: 66
Discharge: HOME | End: 2021-06-15
Admitting: ADVANCED PRACTICE MIDWIFE

## 2021-06-15 DIAGNOSIS — Z12.31 VISIT FOR SCREENING MAMMOGRAM: ICD-10-CM

## 2021-06-15 PROCEDURE — 77063 BREAST TOMOSYNTHESIS BI: CPT

## 2021-06-15 PROCEDURE — 77067 SCR MAMMO BI INCL CAD: CPT

## 2021-06-15 PROCEDURE — 77063 BREAST TOMOSYNTHESIS BI: CPT | Performed by: RADIOLOGY

## 2021-06-15 PROCEDURE — 77067 SCR MAMMO BI INCL CAD: CPT | Performed by: RADIOLOGY

## 2021-07-02 ENCOUNTER — TRANSCRIBE ORDERS (OUTPATIENT)
Dept: ADMINISTRATIVE | Facility: HOSPITAL | Age: 66
End: 2021-07-02

## 2021-07-02 DIAGNOSIS — Z01.818 OTHER SPECIFIED PRE-OPERATIVE EXAMINATION: Primary | ICD-10-CM

## 2021-07-07 ENCOUNTER — LAB (OUTPATIENT)
Dept: LAB | Facility: HOSPITAL | Age: 66
End: 2021-07-07

## 2021-07-07 DIAGNOSIS — Z01.818 OTHER SPECIFIED PRE-OPERATIVE EXAMINATION: ICD-10-CM

## 2021-07-07 LAB — SARS-COV-2 RNA RESP QL NAA+PROBE: NOT DETECTED

## 2021-07-07 PROCEDURE — U0005 INFEC AGEN DETEC AMPLI PROBE: HCPCS | Performed by: INTERNAL MEDICINE

## 2021-07-07 PROCEDURE — C9803 HOPD COVID-19 SPEC COLLECT: HCPCS

## 2021-07-07 PROCEDURE — U0003 INFECTIOUS AGENT DETECTION BY NUCLEIC ACID (DNA OR RNA); SEVERE ACUTE RESPIRATORY SYNDROME CORONAVIRUS 2 (SARS-COV-2) (CORONAVIRUS DISEASE [COVID-19]), AMPLIFIED PROBE TECHNIQUE, MAKING USE OF HIGH THROUGHPUT TECHNOLOGIES AS DESCRIBED BY CMS-2020-01-R: HCPCS | Performed by: INTERNAL MEDICINE

## 2021-07-19 ENCOUNTER — HOSPITAL ENCOUNTER (OUTPATIENT)
Dept: HOSPITAL 79 - EXRD | Age: 66
End: 2021-07-19
Attending: INTERNAL MEDICINE
Payer: MEDICARE

## 2021-07-19 DIAGNOSIS — M81.0: Primary | ICD-10-CM

## 2021-07-19 DIAGNOSIS — M85.862: ICD-10-CM

## 2021-09-13 ENCOUNTER — TRANSCRIBE ORDERS (OUTPATIENT)
Dept: ADMINISTRATIVE | Facility: HOSPITAL | Age: 66
End: 2021-09-13

## 2021-09-13 ENCOUNTER — HOSPITAL ENCOUNTER (OUTPATIENT)
Dept: GENERAL RADIOLOGY | Facility: HOSPITAL | Age: 66
Discharge: HOME | End: 2021-09-13
Admitting: NURSE PRACTITIONER

## 2021-09-13 DIAGNOSIS — J20.9 ACUTE BRONCHITIS, UNSPECIFIED ORGANISM: ICD-10-CM

## 2021-09-13 DIAGNOSIS — J20.9 ACUTE BRONCHITIS, UNSPECIFIED ORGANISM: Primary | ICD-10-CM

## 2021-09-13 PROCEDURE — 71046 X-RAY EXAM CHEST 2 VIEWS: CPT

## 2021-09-13 PROCEDURE — 71046 X-RAY EXAM CHEST 2 VIEWS: CPT | Performed by: RADIOLOGY

## 2022-07-26 ENCOUNTER — OFFICE VISIT (OUTPATIENT)
Dept: GASTROENTEROLOGY | Facility: CLINIC | Age: 67
End: 2022-07-26

## 2022-07-26 VITALS
SYSTOLIC BLOOD PRESSURE: 146 MMHG | HEART RATE: 83 BPM | BODY MASS INDEX: 38.01 KG/M2 | WEIGHT: 193.6 LBS | DIASTOLIC BLOOD PRESSURE: 90 MMHG | TEMPERATURE: 97.8 F | HEIGHT: 60 IN | OXYGEN SATURATION: 97 %

## 2022-07-26 DIAGNOSIS — K58.0 IRRITABLE BOWEL SYNDROME WITH DIARRHEA: Primary | ICD-10-CM

## 2022-07-26 DIAGNOSIS — K22.70 BARRETT'S ESOPHAGUS WITHOUT DYSPLASIA: ICD-10-CM

## 2022-07-26 DIAGNOSIS — K90.89 BILE SALT-INDUCED DIARRHEA: ICD-10-CM

## 2022-07-26 PROBLEM — G47.33 OBSTRUCTIVE SLEEP APNEA SYNDROME: Status: ACTIVE | Noted: 2022-07-26

## 2022-07-26 PROBLEM — I10 ESSENTIAL HYPERTENSION: Status: ACTIVE | Noted: 2022-07-26

## 2022-07-26 PROBLEM — M54.50 LOW BACK PAIN: Status: ACTIVE | Noted: 2022-07-26

## 2022-07-26 PROBLEM — J06.9 ACUTE UPPER RESPIRATORY INFECTION: Status: ACTIVE | Noted: 2022-07-26

## 2022-07-26 PROBLEM — E11.9 TYPE 2 DIABETES, DIET CONTROLLED: Status: ACTIVE | Noted: 2022-07-26

## 2022-07-26 PROBLEM — E11.9 TYPE 2 DIABETES MELLITUS: Status: ACTIVE | Noted: 2022-07-26

## 2022-07-26 PROBLEM — K21.9 GASTROESOPHAGEAL REFLUX DISEASE: Status: ACTIVE | Noted: 2022-07-26

## 2022-07-26 PROBLEM — R07.0 PAIN IN THROAT: Status: ACTIVE | Noted: 2022-07-26

## 2022-07-26 PROCEDURE — 99204 OFFICE O/P NEW MOD 45 MIN: CPT | Performed by: NURSE PRACTITIONER

## 2022-07-26 RX ORDER — DICYCLOMINE HCL 20 MG
TABLET ORAL EVERY 6 HOURS
COMMUNITY
Start: 2022-02-11

## 2022-07-26 RX ORDER — OCTISALATE, AVOBENZONE, HOMOSALATE, AND OCTOCRYLENE 29.4; 29.4; 49; 25.48 MG/ML; MG/ML; MG/ML; MG/ML
LOTION TOPICAL
COMMUNITY

## 2022-07-26 NOTE — PROGRESS NOTES
"     New Patient Consultation     Patient Name: Ashely Frederick  : 1955   MRN: [id number]     Chief Complaint:    Chief Complaint   Patient presents with   • gastroenteritis       History of Present Illness: Ashely Frederick is a 66 y.o. female who is here today for a Gastroenterology Consultation for bloating and abdominal pain.    For over 20 years, since her cassie, she has had postprandial abdominal pain and need to have BM urgently- the stool can be normal or loose.  She is here today and she has been having \"bouts\" of abdominal pain and bloating with excessive gas.. It is associated with RLQ pain and tenderness.  Sometimes she will feel nauseated with the episodes and will have diarrhea and tenesmus. Taking bentyl with this causes fatigue. She has no known or documented hx of diverticulosis and diverticulitis.  Her baseline includes normal Bms 1-2 days a day. She denies history of constipation. There is no blood in the stool.  She does have diabetes that is diet controlled.   She keeps RLQ pain and sciatic pain. Hip is sore to touch.      Ashely has a history of Chacon's esophogus- dx in .  She gets scoped every few years.  Last scope was in 2021 with Dr. Wilkins in [place].  No family history of esophageal Cancer.  She is on Dexilant without breakthrough - no dysphagia.  Sleeps with HOB elevated.        Her last colonoscopy was in - she believes she was told 10 year recall.      Her abdominal surgical history include cassie and hysterectomy  Subjective      Review of Systems:   Review of Systems   Constitutional: Negative for appetite change and unexpected weight loss.   HENT: Negative for trouble swallowing.    Gastrointestinal: Positive for abdominal distention, abdominal pain and diarrhea. Negative for anal bleeding, blood in stool, constipation, nausea, rectal pain, vomiting, GERD and indigestion.   Musculoskeletal: Positive for arthralgias and back pain.       Past Medical History:   Past " Medical History:   Diagnosis Date   • Asthma    • Chacon's esophagus without dysplasia    • Cyst of thyroid    • GERD without esophagitis    • Hypertension    • Hypothyroidism        Past Surgical History:   Past Surgical History:   Procedure Laterality Date   • GALLBLADDER SURGERY     • HYSTERECTOMY     • TOOTH EXTRACTION         Family History:   Family History   Problem Relation Age of Onset   • Dementia Mother    • Heart disease Father    • Arthritis Brother    • Breast cancer Neg Hx        Social History:   Social History     Socioeconomic History   • Marital status:    Tobacco Use   • Smoking status: Never Smoker   • Smokeless tobacco: Never Used   Vaping Use   • Vaping Use: Never used   Substance and Sexual Activity   • Alcohol use: No   • Drug use: No   • Sexual activity: Defer       Alcohol/Tobacco History:   Social History     Substance and Sexual Activity   Alcohol Use No     Social History     Tobacco Use   Smoking Status Never Smoker   Smokeless Tobacco Never Used       Medications:     Current Outpatient Medications:   •  aspirin 81 MG chewable tablet, Chew 81 mg Daily., Disp: , Rfl:   •  bisoprolol-hydrochlorothiazide (ZIAC) 5-6.25 MG per tablet, take 1 tablet by oral route every day, Disp: , Rfl:   •  cetirizine-pseudoephedrine (ZyrTEC-D) 5-120 MG per 12 hr tablet, Take  by mouth Every 12 (Twelve) Hours., Disp: , Rfl:   •  dexlansoprazole (DEXILANT) 60 MG capsule, Take 60 mg by mouth Daily., Disp: , Rfl:   •  dicyclomine (BENTYL) 20 MG tablet, Take  by mouth Every 6 (Six) Hours. As needed, Disp: , Rfl:   •  LORazepam (ATIVAN) 0.5 MG tablet, Take 0.5 mg by mouth As Needed. 1 po apthak, Disp: , Rfl:   •  losartan (COZAAR) 50 MG tablet, take 1 tablet by oral route every day, Disp: , Rfl:   •  Probiotic Product (Align) 4 MG capsule, Take  by mouth., Disp: , Rfl:   •  Restasis 0.05 % ophthalmic emulsion, Administer 1 drop to both eyes. 1 drop each eye, Disp: , Rfl:   •  sertraline (ZOLOFT) 50 MG  "tablet, Take 50 mg by mouth Daily., Disp: , Rfl:   •  Synthroid 50 MCG tablet, Take 1 tablet by mouth Daily., Disp: 90 tablet, Rfl: 3  •  albuterol sulfate  (90 Base) MCG/ACT inhaler, Inhale 1 puff As Needed., Disp: , Rfl:   •  riFAXIMin (XIFAXAN) 550 MG tablet, Take 1 tablet by mouth 3 (Three) Times a Day., Disp: 42 tablet, Rfl: 0    Allergies:   Allergies   Allergen Reactions   • Erythromycin GI Intolerance       Objective     Physical Exam:  Vital Signs:   Vitals:    07/26/22 1308   BP: 146/90   BP Location: Left arm   Patient Position: Sitting   Cuff Size: Adult   Pulse: 83   Temp: 97.8 °F (36.6 °C)   TempSrc: Temporal   SpO2: 97%   Weight: 87.8 kg (193 lb 9.6 oz)   Height: 152.4 cm (60\")     Body mass index is 37.81 kg/m².     Physical Exam  Vitals and nursing note reviewed.   Constitutional:       General: She is not in acute distress.     Appearance: She is well-developed. She is not diaphoretic.   Eyes:      General: No scleral icterus.     Conjunctiva/sclera: Conjunctivae normal.   Neck:      Thyroid: No thyromegaly.   Cardiovascular:      Rate and Rhythm: Normal rate and regular rhythm.   Pulmonary:      Effort: Pulmonary effort is normal.      Breath sounds: Normal breath sounds.   Abdominal:      General: Bowel sounds are normal. There is no distension.      Palpations: Abdomen is soft.      Tenderness: There is no abdominal tenderness. There is no guarding or rebound.      Hernia: No hernia is present.   Musculoskeletal:      Cervical back: Neck supple.      Right hip: Bony tenderness present.      Right lower leg: No edema.      Left lower leg: No edema.   Skin:     General: Skin is warm and dry.      Capillary Refill: Capillary refill takes 2 to 3 seconds.      Coloration: Skin is not jaundiced or pale.      Findings: No bruising or petechiae.      Nails: There is no clubbing.   Neurological:      Mental Status: She is alert and oriented to person, place, and time.   Psychiatric:         " Behavior: Behavior normal.         Thought Content: Thought content normal.         Judgment: Judgment normal.         Assessment / Plan      Assessment/Plan:   Diagnoses and all orders for this visit:    1. Irritable bowel syndrome with diarrhea (Primary)  -     riFAXIMin (XIFAXAN) 550 MG tablet; Take 1 tablet by mouth 3 (Three) Times a Day.  Dispense: 42 tablet; Refill: 0  Ashely has history of bile salt diarrhea but is having bouts of worsening pain and bloating.  This could be a manifestation of her IBS or possibly bacterial overgrowth or food intolerance among other things.  I recommend she continue to take her probiotic align.  We discussed keeping a symptom diary.  We will start Xifaxan x2 weeks.  We will request previous GI records   2. Bile salt-induced diarrhea  We discussed pathology behind bile salt induced diarrhea.  We discussed we could try bile salt sequestrant in future.  3. Chacon's esophagus without dysplasia  Request records from previous GI-continue Dexilant and reflux precautions       Follow Up:   Return in about 8 weeks (around 9/20/2022), or if symptoms worsen or fail to improve.    Plan of care reviewed with the patient at the conclusion of today's visit.  Education was provided regarding diagnosis, management, and any prescribed or recommended OTC medications.  Patient verbalized understanding of and agreement with management plan.     CAITLIN Dominguez  [facility]

## 2022-07-27 ENCOUNTER — PRIOR AUTHORIZATION (OUTPATIENT)
Dept: GASTROENTEROLOGY | Facility: CLINIC | Age: 67
End: 2022-07-27

## 2022-07-29 ENCOUNTER — LAB (OUTPATIENT)
Dept: LAB | Facility: HOSPITAL | Age: 67
End: 2022-07-29

## 2022-07-29 ENCOUNTER — TRANSCRIBE ORDERS (OUTPATIENT)
Dept: ADMINISTRATIVE | Facility: HOSPITAL | Age: 67
End: 2022-07-29

## 2022-07-29 ENCOUNTER — HOSPITAL ENCOUNTER (OUTPATIENT)
Dept: MAMMOGRAPHY | Facility: HOSPITAL | Age: 67
Discharge: HOME | End: 2022-07-29

## 2022-07-29 DIAGNOSIS — M25.50 PAIN IN JOINT, MULTIPLE SITES: ICD-10-CM

## 2022-07-29 DIAGNOSIS — R76.0 RAISED ANTIBODY TITER: ICD-10-CM

## 2022-07-29 DIAGNOSIS — R70.0 ELEVATED SEDIMENTATION RATE: ICD-10-CM

## 2022-07-29 DIAGNOSIS — M47.26 OSTEOARTHRITIS OF SPINE WITH RADICULOPATHY, LUMBAR REGION: Primary | ICD-10-CM

## 2022-07-29 DIAGNOSIS — Z12.31 VISIT FOR SCREENING MAMMOGRAM: ICD-10-CM

## 2022-07-29 DIAGNOSIS — M47.26 OSTEOARTHRITIS OF SPINE WITH RADICULOPATHY, LUMBAR REGION: ICD-10-CM

## 2022-07-29 PROCEDURE — 86160 COMPLEMENT ANTIGEN: CPT

## 2022-07-29 PROCEDURE — 77063 BREAST TOMOSYNTHESIS BI: CPT

## 2022-07-29 PROCEDURE — 86431 RHEUMATOID FACTOR QUANT: CPT

## 2022-07-29 PROCEDURE — 84165 PROTEIN E-PHORESIS SERUM: CPT

## 2022-07-29 PROCEDURE — 84155 ASSAY OF PROTEIN SERUM: CPT

## 2022-07-29 PROCEDURE — 82784 ASSAY IGA/IGD/IGG/IGM EACH: CPT

## 2022-07-29 PROCEDURE — 77063 BREAST TOMOSYNTHESIS BI: CPT | Performed by: RADIOLOGY

## 2022-07-29 PROCEDURE — 77067 SCR MAMMO BI INCL CAD: CPT

## 2022-07-29 PROCEDURE — 77067 SCR MAMMO BI INCL CAD: CPT | Performed by: RADIOLOGY

## 2022-07-29 PROCEDURE — 86235 NUCLEAR ANTIGEN ANTIBODY: CPT

## 2022-07-29 PROCEDURE — 86334 IMMUNOFIX E-PHORESIS SERUM: CPT

## 2022-07-29 PROCEDURE — 36415 COLL VENOUS BLD VENIPUNCTURE: CPT

## 2022-07-29 PROCEDURE — 86200 CCP ANTIBODY: CPT

## 2022-07-30 LAB
C3 SERPL-MCNC: 205 MG/DL (ref 82–167)
C4 SERPL-MCNC: 49 MG/DL (ref 14–44)
CHROMATIN AB SERPL-ACNC: <10 IU/ML (ref 0–14)

## 2022-08-01 LAB
ALBUMIN SERPL ELPH-MCNC: 3.6 G/DL (ref 2.9–4.4)
ALBUMIN/GLOB SERPL: 1.1 {RATIO} (ref 0.7–1.7)
ALPHA1 GLOB SERPL ELPH-MCNC: 0.3 G/DL (ref 0–0.4)
ALPHA2 GLOB SERPL ELPH-MCNC: 1 G/DL (ref 0.4–1)
B-GLOBULIN SERPL ELPH-MCNC: 1.4 G/DL (ref 0.7–1.3)
ENA SS-A AB SER-ACNC: <0.2 AI (ref 0–0.9)
GAMMA GLOB SERPL ELPH-MCNC: 1 G/DL (ref 0.4–1.8)
GLOBULIN SER-MCNC: 3.6 G/DL (ref 2.2–3.9)
IGA SERPL-MCNC: 189 MG/DL (ref 87–352)
IGG SERPL-MCNC: 928 MG/DL (ref 586–1602)
IGM SERPL-MCNC: 50 MG/DL (ref 26–217)
INTERPRETATION SERPL IEP-IMP: ABNORMAL
LABORATORY COMMENT REPORT: ABNORMAL
M PROTEIN SERPL ELPH-MCNC: ABNORMAL G/DL
PROT SERPL-MCNC: 7.2 G/DL (ref 6–8.5)

## 2022-08-02 LAB — CCP IGA+IGG SERPL IA-ACNC: 7 UNITS (ref 0–19)

## 2022-10-03 ENCOUNTER — OFFICE VISIT (OUTPATIENT)
Dept: GASTROENTEROLOGY | Facility: CLINIC | Age: 67
End: 2022-10-03

## 2022-10-03 VITALS
BODY MASS INDEX: 39.11 KG/M2 | HEART RATE: 77 BPM | TEMPERATURE: 97.7 F | OXYGEN SATURATION: 98 % | HEIGHT: 60 IN | DIASTOLIC BLOOD PRESSURE: 88 MMHG | WEIGHT: 199.2 LBS | SYSTOLIC BLOOD PRESSURE: 142 MMHG

## 2022-10-03 DIAGNOSIS — K58.0 IRRITABLE BOWEL SYNDROME WITH DIARRHEA: Primary | ICD-10-CM

## 2022-10-03 DIAGNOSIS — K22.70 BARRETT'S ESOPHAGUS WITHOUT DYSPLASIA: ICD-10-CM

## 2022-10-03 PROCEDURE — 99214 OFFICE O/P EST MOD 30 MIN: CPT | Performed by: NURSE PRACTITIONER

## 2022-10-03 NOTE — PROGRESS NOTES
Follow Up      Patient Name: Ashely Frederick  : 1955   MRN: [id number]     Chief Complaint:    Chief Complaint   Patient presents with   • Follow-up     2 mo follow up on IBS-D       History of Present Illness: Ashely Frederick is a 66 y.o. female who is here today for follow up on IBS/ Chacon's    Last visit, Xifaxan was prescribed- she took this with great response. Having normal bowel movements about once a day now.  Will have some diarrhea with certain foods but not often.  She is on her probiotic. Urgency and bloating significantly improved.      GERD/ Chacon's (dx in ): on Dexilant- no breakthrough.  Not interested in HH repair at this time. Follows reflux precautions         Her abdominal surgical history include cassie and hysterectomy      SCANNED - COLONOSCOPY (2021)-cecal polyp, diverticulosis  ENDOSCOPY, INT (2021)-moderate hiatal hernia, short segment Chacon's esophagus, multiple ulcerated gastric polyps      Subjective      Review of Systems:   Review of Systems   Constitutional: Negative for appetite change and unexpected weight loss.   HENT: Negative for trouble swallowing.    Gastrointestinal: Positive for diarrhea. Negative for abdominal distention, abdominal pain, anal bleeding, blood in stool, constipation, nausea, rectal pain, vomiting, GERD and indigestion.       Medications:     Current Outpatient Medications:   •  albuterol sulfate  (90 Base) MCG/ACT inhaler, Inhale 1 puff As Needed., Disp: , Rfl:   •  aspirin 81 MG chewable tablet, Chew 81 mg Daily., Disp: , Rfl:   •  bisoprolol-hydrochlorothiazide (ZIAC) 5-6.25 MG per tablet, take 1 tablet by oral route every day, Disp: , Rfl:   •  cetirizine-pseudoephedrine (ZyrTEC-D) 5-120 MG per 12 hr tablet, Take  by mouth Every 12 (Twelve) Hours., Disp: , Rfl:   •  dexlansoprazole (DEXILANT) 60 MG capsule, Take 60 mg by mouth Daily., Disp: , Rfl:   •  dicyclomine (BENTYL) 20 MG tablet, Take  by mouth Every 6 (Six) Hours.  "As needed, Disp: , Rfl:   •  LORazepam (ATIVAN) 0.5 MG tablet, Take 0.5 mg by mouth As Needed. 1 po pathak, Disp: , Rfl:   •  losartan (COZAAR) 50 MG tablet, take 1 tablet by oral route every day, Disp: , Rfl:   •  Probiotic Product (Align) 4 MG capsule, Take  by mouth., Disp: , Rfl:   •  Restasis 0.05 % ophthalmic emulsion, Administer 1 drop to both eyes. 1 drop each eye, Disp: , Rfl:   •  sertraline (ZOLOFT) 50 MG tablet, Take 50 mg by mouth Daily., Disp: , Rfl:   •  Synthroid 50 MCG tablet, Take 1 tablet by mouth Daily., Disp: 90 tablet, Rfl: 3    Allergies:   Allergies   Allergen Reactions   • Erythromycin GI Intolerance       Social History:   Social History     Socioeconomic History   • Marital status:    Tobacco Use   • Smoking status: Never Smoker   • Smokeless tobacco: Never Used   Vaping Use   • Vaping Use: Never used   Substance and Sexual Activity   • Alcohol use: No   • Drug use: No   • Sexual activity: Defer        Surgical History:   Past Surgical History:   Procedure Laterality Date   • GALLBLADDER SURGERY     • HYSTERECTOMY  1989    benign   • TOOTH EXTRACTION          Medical History:   Past Medical History:   Diagnosis Date   • Asthma    • Chacon's esophagus without dysplasia    • Cyst of thyroid    • GERD without esophagitis    • Hypertension    • Hypothyroidism         Objective     Physical Exam:  Vital Signs:   Vitals:    10/03/22 1319   BP: 142/88   BP Location: Right arm   Patient Position: Sitting   Cuff Size: Adult   Pulse: 77   Temp: 97.7 °F (36.5 °C)   TempSrc: Temporal   SpO2: 98%   Weight: 90.4 kg (199 lb 3.2 oz)   Height: 152.4 cm (60\")     Body mass index is 38.9 kg/m².     Physical Exam  Vitals and nursing note reviewed.   Constitutional:       General: She is not in acute distress.     Appearance: She is well-developed.   Pulmonary:      Effort: Pulmonary effort is normal. No accessory muscle usage or respiratory distress.   Skin:     Coloration: Skin is not pale.      " Findings: No erythema.   Neurological:      Mental Status: She is alert and oriented to person, place, and time.   Psychiatric:         Speech: Speech normal.         Behavior: Behavior normal.         Thought Content: Thought content normal.         Judgment: Judgment normal.         Assessment / Plan      Assessment/Plan:   Diagnoses and all orders for this visit:    1. Irritable bowel syndrome with diarrhea (Primary)  Dramatic improvement with Xifaxan.  Advised we can review dose if symptoms return.  Continue probiotic.  2. Chacon's esophagus without dysplasia  Stable GERD on Dexilant without breakthrough.  Continue reflux precautions.  No refill required at this time       Follow Up:   Return in about 6 months (around 4/3/2023), or if symptoms worsen or fail to improve.    Plan of care reviewed with the patient at the conclusion of today's visit.  Education was provided regarding diagnosis, management, and any prescribed or recommended OTC medications.  Patient verbalized understanding of and agreement with management plan.         CAITLIN Dominguez  [facility]

## 2022-10-13 ENCOUNTER — LAB (OUTPATIENT)
Dept: LAB | Facility: HOSPITAL | Age: 67
End: 2022-10-13

## 2022-10-13 ENCOUNTER — OFFICE VISIT (OUTPATIENT)
Dept: ENDOCRINOLOGY | Facility: CLINIC | Age: 67
End: 2022-10-13

## 2022-10-13 VITALS
BODY MASS INDEX: 39.19 KG/M2 | DIASTOLIC BLOOD PRESSURE: 72 MMHG | HEART RATE: 85 BPM | SYSTOLIC BLOOD PRESSURE: 120 MMHG | HEIGHT: 60 IN | WEIGHT: 199.6 LBS | OXYGEN SATURATION: 95 %

## 2022-10-13 DIAGNOSIS — E03.9 ACQUIRED HYPOTHYROIDISM: ICD-10-CM

## 2022-10-13 DIAGNOSIS — E03.9 ACQUIRED HYPOTHYROIDISM: Primary | ICD-10-CM

## 2022-10-13 PROCEDURE — 84443 ASSAY THYROID STIM HORMONE: CPT

## 2022-10-13 PROCEDURE — 99213 OFFICE O/P EST LOW 20 MIN: CPT | Performed by: INTERNAL MEDICINE

## 2022-10-13 RX ORDER — CEFDINIR 300 MG/1
300 CAPSULE ORAL EVERY 12 HOURS
COMMUNITY
Start: 2022-10-06 | End: 2023-04-03

## 2022-10-13 NOTE — ASSESSMENT & PLAN NOTE
Stable.   Clinically euthyroid. Thyroid levels ordered. Medication to be adjusted accordingly.  Cyst is stble to exam

## 2022-10-13 NOTE — PROGRESS NOTES
"     Office Note      Date: 10/13/2022  Patient Name: Ashely Frederick  MRN: [id number]  : 1955    Chief Complaint   Patient presents with   • Hypothyroidism       History of Present Illness:   Ashely Frederick is a 66 y.o. female who presents for Hypothyroidism  rx: 50 mcg per day  She has a 1.7 cm thyroid cyst which has been stable by ultrasound criteria  ---  There has been no change to her medical history    Subjective          Review of Systems:   Review of Systems   Constitutional: Positive for fatigue. Negative for unexpected weight change.   HENT: Positive for voice change. Negative for trouble swallowing.    Respiratory: Positive for cough.        The following portions of the patient's history were reviewed and updated as appropriate: allergies, current medications, past family history, past medical history, past social history, past surgical history and problem list.    Objective     Visit Vitals  /72 (BP Location: Right arm, Patient Position: Sitting, Cuff Size: Adult)   Pulse 85   Ht 152.4 cm (60\")   Wt 90.5 kg (199 lb 9.6 oz)   SpO2 95%   BMI 38.98 kg/m²       Labs:    CBC w/DIFF  Lab Results   Component Value Date    WBC 7.51 2019    RBC 4.46 2019    HGB 11.8 (L) 2019    HCT 38.5 2019    MCV 86.3 2019    MCH 26.5 (L) 2019    MCHC 30.6 (L) 2019    RDW 15.9 (H) 2019    RDWSD 50.5 2019    MPV 8.9 2019     2019    NEUTRORELPCT 80.2 (H) 2019    LYMPHORELPCT 11.6 (L) 2019    MONORELPCT 5.9 2019    EOSRELPCT 1.2 2019    BASORELPCT 0.4 2019    AUTOIGPER 0.7 (H) 2019    NEUTROABS 6.03 2019    LYMPHSABS 0.87 2019    MONOSABS 0.44 2019    EOSABS 0.09 2019    BASOSABS 0.03 2019    AUTOIGNUM 0.05 2019    NRBC 0.0 2019       T4  No results found for: FREET4    TSH  No results found for: TSHBASE     Physical Exam:  Physical Exam  Vitals reviewed. "   Constitutional:       Appearance: Normal appearance.   HENT:      Head: Normocephalic and atraumatic.   Eyes:      Extraocular Movements: Extraocular movements intact.   Neck:      Comments: No palpable thyroid abnormality   Lymphadenopathy:      Cervical: No cervical adenopathy.   Neurological:      Mental Status: She is alert.   Psychiatric:         Mood and Affect: Mood normal.         Behavior: Behavior normal.         Thought Content: Thought content normal.         Judgment: Judgment normal.         Assessment / Plan      Assessment & Plan:  Problem List Items Addressed This Visit        Other    Acquired hypothyroidism - Primary    Current Assessment & Plan     Stable.   Clinically euthyroid. Thyroid levels ordered. Medication to be adjusted accordingly.  Cyst is stble to exam         Relevant Medications    Synthroid 50 MCG tablet        Familia Grover MD   10/13/2022

## 2022-10-14 LAB — TSH SERPL DL<=0.05 MIU/L-ACNC: 2.52 UIU/ML (ref 0.27–4.2)

## 2022-10-14 RX ORDER — LEVOTHYROXINE SODIUM 50 MCG
50 TABLET ORAL DAILY
Qty: 90 TABLET | Refills: 3 | Status: SHIPPED | OUTPATIENT
Start: 2022-10-14

## 2022-12-20 ENCOUNTER — TRANSCRIBE ORDERS (OUTPATIENT)
Dept: ADMINISTRATIVE | Facility: HOSPITAL | Age: 67
End: 2022-12-20

## 2022-12-20 ENCOUNTER — HOSPITAL ENCOUNTER (OUTPATIENT)
Dept: GENERAL RADIOLOGY | Facility: HOSPITAL | Age: 67
Discharge: HOME | End: 2022-12-20
Admitting: INTERNAL MEDICINE

## 2022-12-20 DIAGNOSIS — J40 BRONCHITIS: Primary | ICD-10-CM

## 2022-12-20 DIAGNOSIS — J40 BRONCHITIS: ICD-10-CM

## 2022-12-20 PROCEDURE — 71046 X-RAY EXAM CHEST 2 VIEWS: CPT | Performed by: RADIOLOGY

## 2022-12-20 PROCEDURE — 71046 X-RAY EXAM CHEST 2 VIEWS: CPT

## 2022-12-22 ENCOUNTER — TRANSCRIBE ORDERS (OUTPATIENT)
Dept: ADMINISTRATIVE | Facility: HOSPITAL | Age: 67
End: 2022-12-22

## 2022-12-22 DIAGNOSIS — M79.604 RIGHT LEG PAIN: ICD-10-CM

## 2022-12-22 DIAGNOSIS — R06.02 SHORTNESS OF BREATH: Primary | ICD-10-CM

## 2023-01-04 ENCOUNTER — APPOINTMENT (OUTPATIENT)
Dept: CARDIOLOGY | Facility: HOSPITAL | Age: 68
End: 2023-01-04
Payer: MEDICARE

## 2023-01-20 ENCOUNTER — HOSPITAL ENCOUNTER (OUTPATIENT)
Dept: CT IMAGING | Facility: HOSPITAL | Age: 68
Discharge: HOME | End: 2023-01-20
Admitting: INTERNAL MEDICINE
Payer: MEDICARE

## 2023-01-20 DIAGNOSIS — R06.02 SHORTNESS OF BREATH: ICD-10-CM

## 2023-01-20 DIAGNOSIS — M79.604 RIGHT LEG PAIN: ICD-10-CM

## 2023-01-20 PROCEDURE — 71250 CT THORAX DX C-: CPT

## 2023-01-20 PROCEDURE — 71250 CT THORAX DX C-: CPT | Performed by: RADIOLOGY

## 2023-04-03 ENCOUNTER — OFFICE VISIT (OUTPATIENT)
Dept: GASTROENTEROLOGY | Facility: CLINIC | Age: 68
End: 2023-04-03
Payer: MEDICARE

## 2023-04-03 VITALS
OXYGEN SATURATION: 96 % | SYSTOLIC BLOOD PRESSURE: 122 MMHG | BODY MASS INDEX: 39.82 KG/M2 | TEMPERATURE: 96 F | HEIGHT: 60 IN | WEIGHT: 202.8 LBS | DIASTOLIC BLOOD PRESSURE: 78 MMHG | HEART RATE: 76 BPM

## 2023-04-03 DIAGNOSIS — K22.70 BARRETT'S ESOPHAGUS WITHOUT DYSPLASIA: ICD-10-CM

## 2023-04-03 DIAGNOSIS — D50.9 IRON DEFICIENCY ANEMIA, UNSPECIFIED IRON DEFICIENCY ANEMIA TYPE: ICD-10-CM

## 2023-04-03 DIAGNOSIS — K58.0 IRRITABLE BOWEL SYNDROME WITH DIARRHEA: Primary | ICD-10-CM

## 2023-04-03 PROCEDURE — 3078F DIAST BP <80 MM HG: CPT | Performed by: NURSE PRACTITIONER

## 2023-04-03 PROCEDURE — 1159F MED LIST DOCD IN RCRD: CPT | Performed by: NURSE PRACTITIONER

## 2023-04-03 PROCEDURE — 1160F RVW MEDS BY RX/DR IN RCRD: CPT | Performed by: NURSE PRACTITIONER

## 2023-04-03 PROCEDURE — 3074F SYST BP LT 130 MM HG: CPT | Performed by: NURSE PRACTITIONER

## 2023-04-03 PROCEDURE — 99214 OFFICE O/P EST MOD 30 MIN: CPT | Performed by: NURSE PRACTITIONER

## 2023-04-03 RX ORDER — DEXLANSOPRAZOLE 60 MG/1
60 CAPSULE, DELAYED RELEASE ORAL DAILY
Qty: 30 CAPSULE | Refills: 2 | Status: SHIPPED | OUTPATIENT
Start: 2023-04-03

## 2023-04-03 NOTE — PROGRESS NOTES
"     Follow Up      Patient Name: Ashely Frederick  : 1955   MRN: [id number]     Chief Complaint:    Chief Complaint   Patient presents with   • Follow-up       History of Present Illness: Ashely Frederick is a 67 y.o. female who is here today for follow up on IBS/ barretts    IBS:  Overall doing well.  Has bouts of diarrhea or gas.  Typically improves within a few days. She is on align probiotic.  Has an RX for bentyl she will use for \"flares\".           GERD/ Chacon's (dx in ): on Dexilant- compliant- rarely has breakthrough.  Not interested in HH repair at this time. Follows reflux precautions       Was told in the fall by her PCP that she had iron deficiency anemia with a hemoglobin around 8.  Started on oral iron and this did improve to 10.  She denies any blood in her stool that is visible.     Her abdominal surgical history include cassie and hysterectomy        SCANNED - COLONOSCOPY (2021)-2014-cecal polyp, diverticulosis.  ENDOSCOPY, INT (2021)-moderate hiatal hernia, short segment Chacon's esophagus, multiple ulcerated gastric polyps    Subjective      Review of Systems:   Review of Systems   Constitutional: Negative for appetite change and unexpected weight loss.   HENT: Negative for trouble swallowing.    Gastrointestinal: Positive for diarrhea. Negative for abdominal distention, abdominal pain, anal bleeding, blood in stool, constipation, nausea, rectal pain, vomiting, GERD and indigestion.       Medications:     Current Outpatient Medications:   •  albuterol sulfate  (90 Base) MCG/ACT inhaler, Inhale 1 puff As Needed., Disp: , Rfl:   •  aspirin 81 MG chewable tablet, Chew 1 tablet Daily., Disp: , Rfl:   •  bisoprolol-hydrochlorothiazide (ZIAC) 5-6.25 MG per tablet, take 1 tablet by oral route every day, Disp: , Rfl:   •  cetirizine-pseudoephedrine (ZyrTEC-D) 5-120 MG per 12 hr tablet, Take  by mouth Every 12 (Twelve) Hours., Disp: , Rfl:   •  dexlansoprazole (DEXILANT) 60 MG " capsule, Take 1 capsule by mouth Daily., Disp: 30 capsule, Rfl: 2  •  dicyclomine (BENTYL) 20 MG tablet, Take  by mouth Every 6 (Six) Hours. As needed, Disp: , Rfl:   •  LORazepam (ATIVAN) 0.5 MG tablet, Take 1 tablet by mouth As Needed. 1 po pathak, Disp: , Rfl:   •  losartan (COZAAR) 50 MG tablet, take 1 tablet by oral route every day, Disp: , Rfl:   •  Probiotic Product (Align) 4 MG capsule, Take  by mouth., Disp: , Rfl:   •  Restasis 0.05 % ophthalmic emulsion, Administer 1 drop to both eyes. 1 drop each eye, Disp: , Rfl:   •  sertraline (ZOLOFT) 50 MG tablet, Take 1 tablet by mouth Daily., Disp: , Rfl:   •  Synthroid 50 MCG tablet, Take 1 tablet by mouth Daily., Disp: 90 tablet, Rfl: 3    Allergies:   Allergies   Allergen Reactions   • Erythromycin GI Intolerance       Social History:   Social History     Socioeconomic History   • Marital status:    Tobacco Use   • Smoking status: Never   • Smokeless tobacco: Never   Vaping Use   • Vaping Use: Never used   Substance and Sexual Activity   • Alcohol use: No   • Drug use: No   • Sexual activity: Not Currently        Surgical History:   Past Surgical History:   Procedure Laterality Date   • ABDOMINAL SURGERY  1991   • CHOLECYSTECTOMY  1999   • COLONOSCOPY  2014   • GALLBLADDER SURGERY     • HYSTERECTOMY  1989    benign   • TOOTH EXTRACTION     • UPPER GASTROINTESTINAL ENDOSCOPY  2020        Medical History:   Past Medical History:   Diagnosis Date   • Anemia 2023   • Asthma    • Chacon esophagus 2014   • Chacon's esophagus without dysplasia    • Cyst of thyroid    • Fatty liver    • GERD without esophagitis    • Hypertension    • Hypothyroidism    • Irritable bowel syndrome 1989        Objective     Physical Exam:  Vital Signs:   Vitals:    04/03/23 1140   BP: 122/78   BP Location: Right arm   Patient Position: Lying   Cuff Size: Adult   Pulse: 76   Temp: 96 °F (35.6 °C)   TempSrc: Temporal   SpO2: 96%   Weight: 92 kg (202 lb 12.8 oz)   Height: 152.4 cm  "(60\")     Body mass index is 39.61 kg/m².     Physical Exam  Vitals and nursing note reviewed.   Constitutional:       General: She is not in acute distress.     Appearance: She is well-developed. She is not diaphoretic.   Eyes:      General: No scleral icterus.     Conjunctiva/sclera: Conjunctivae normal.   Neck:      Thyroid: No thyromegaly.   Cardiovascular:      Rate and Rhythm: Normal rate and regular rhythm.   Pulmonary:      Effort: Pulmonary effort is normal.      Breath sounds: Normal breath sounds.   Abdominal:      General: Bowel sounds are normal. There is no distension.      Palpations: Abdomen is soft.      Tenderness: There is no abdominal tenderness. There is no guarding or rebound.      Hernia: No hernia is present.   Musculoskeletal:      Cervical back: Neck supple.      Right lower leg: No edema.      Left lower leg: No edema.   Skin:     General: Skin is warm and dry.      Capillary Refill: Capillary refill takes 2 to 3 seconds.      Coloration: Skin is not jaundiced or pale.      Findings: No bruising or petechiae.      Nails: There is no clubbing.   Neurological:      Mental Status: She is alert and oriented to person, place, and time.   Psychiatric:         Behavior: Behavior normal.         Thought Content: Thought content normal.         Judgment: Judgment normal.         Assessment / Plan      Assessment/Plan:   Diagnoses and all orders for this visit:    1. Irritable bowel syndrome with diarrhea (Primary)  -     Ambulatory referral for Screening EGD  -     Ambulatory Referral For Screening Colonoscopy    2. Chacon's esophagus without dysplasia  -     dexlansoprazole (DEXILANT) 60 MG capsule; Take 1 capsule by mouth Daily.  Dispense: 30 capsule; Refill: 2  -     Ambulatory referral for Screening EGD  -     Ambulatory Referral For Screening Colonoscopy    3. Iron deficiency anemia, unspecified iron deficiency anemia type  -     Cancel: Ambulatory referral for Screening EGD  -     Cancel: " Ambulatory Referral For Screening Colonoscopy  -     Ambulatory referral for Screening EGD  -     Ambulatory Referral For Screening Colonoscopy    With her anemia, I recommend repeating her EGD and colonoscopy early.  Continue Dexilant and reflux precautions.  Samples of IBgard given    Follow Up:   Return in about 6 months (around 10/3/2023), or if symptoms worsen or fail to improve.    Plan of care reviewed with the patient at the conclusion of today's visit.  Education was provided regarding diagnosis, management, and any prescribed or recommended OTC medications.  Patient verbalized understanding of and agreement with management plan.         CAITLIN Dominguez  [facility]

## 2023-04-10 ENCOUNTER — TELEPHONE (OUTPATIENT)
Dept: GASTROENTEROLOGY | Facility: CLINIC | Age: 68
End: 2023-04-10
Payer: MEDICARE

## 2023-04-12 ENCOUNTER — PRIOR AUTHORIZATION (OUTPATIENT)
Dept: GASTROENTEROLOGY | Facility: CLINIC | Age: 68
End: 2023-04-12
Payer: MEDICARE

## 2023-04-12 ENCOUNTER — PREP FOR SURGERY (OUTPATIENT)
Dept: OTHER | Facility: HOSPITAL | Age: 68
End: 2023-04-12

## 2023-04-12 DIAGNOSIS — D50.9 IRON DEFICIENCY ANEMIA, UNSPECIFIED IRON DEFICIENCY ANEMIA TYPE: Primary | ICD-10-CM

## 2023-04-12 DIAGNOSIS — K22.70 BARRETT'S ESOPHAGUS WITHOUT DYSPLASIA: ICD-10-CM

## 2023-04-12 DIAGNOSIS — K58.0 IRRITABLE BOWEL SYNDROME WITH DIARRHEA: ICD-10-CM

## 2023-04-12 DIAGNOSIS — K58.0 IRRITABLE BOWEL SYNDROME WITH DIARRHEA: Primary | ICD-10-CM

## 2023-04-12 RX ORDER — RIFAXIMIN 550 MG/1
TABLET ORAL
Qty: 42 TABLET | Refills: 0 | OUTPATIENT
Start: 2023-04-12

## 2023-04-12 NOTE — TELEPHONE ENCOUNTER
LISA MARTIN (Key: [id number])  Rx #: [id number]  Xifaxan 550MG tablets     Form  [facility] (2017 NCPDP)

## 2023-04-12 NOTE — TELEPHONE ENCOUNTER
Ms HIRA Ramsey called this afternoon. She is requesting Xifaxan for her IBS-D symptoms. Thanks   
Patient called back to inform us that her medication is going to need a prior auth and that also [facility].    Please advise and give the patient a call back at [phone] if there is any questions or concerns.  
done  
negative...

## 2023-04-20 PROBLEM — D50.9 IRON DEFICIENCY ANEMIA: Status: ACTIVE | Noted: 2023-04-20

## 2023-04-20 PROBLEM — K58.0 IRRITABLE BOWEL SYNDROME WITH DIARRHEA: Status: ACTIVE | Noted: 2023-04-20

## 2023-04-20 PROBLEM — K22.70 BARRETT'S ESOPHAGUS WITHOUT DYSPLASIA: Status: ACTIVE | Noted: 2023-04-20

## 2023-06-06 ENCOUNTER — TELEPHONE (OUTPATIENT)
Dept: GASTROENTEROLOGY | Facility: CLINIC | Age: 68
End: 2023-06-06
Payer: MEDICARE

## 2023-06-06 NOTE — TELEPHONE ENCOUNTER
"Patient is scheduled an EGD and colonoscopy with Dr Neff on 06/13/23.    Patient requests to speak with someone about the available bowel preparation choices so she can determine which she needs to  at her pharmacy when the prescription is sent.    Patient is hoping for something different \"than the gallon of stuff\" or the \"stuff I have to mix with plain water\"     Please advise, and ensure bowel preparation prescription is filled.  "

## 2023-06-12 ENCOUNTER — PRE-ADMISSION TESTING (OUTPATIENT)
Dept: PREADMISSION TESTING | Facility: HOSPITAL | Age: 68
End: 2023-06-12
Payer: MEDICARE

## 2023-06-12 VITALS — BODY MASS INDEX: 38.91 KG/M2 | HEIGHT: 60 IN | WEIGHT: 198.19 LBS

## 2023-06-12 LAB
DEPRECATED RDW RBC AUTO: 50.7 FL (ref 37–54)
ERYTHROCYTE [DISTWIDTH] IN BLOOD BY AUTOMATED COUNT: 16.5 % (ref 12.3–15.4)
HCT VFR BLD AUTO: 38 % (ref 34–46.6)
HGB BLD-MCNC: 11.1 G/DL (ref 12–15.9)
MCH RBC QN AUTO: 24.6 PG (ref 26.6–33)
MCHC RBC AUTO-ENTMCNC: 29.2 G/DL (ref 31.5–35.7)
MCV RBC AUTO: 84.3 FL (ref 79–97)
PLATELET # BLD AUTO: 401 10*3/MM3 (ref 140–450)
PMV BLD AUTO: 8.7 FL (ref 6–12)
POTASSIUM SERPL-SCNC: 4.7 MMOL/L (ref 3.5–5.2)
QT INTERVAL: 414 MS
QTC INTERVAL: 434 MS
RBC # BLD AUTO: 4.51 10*6/MM3 (ref 3.77–5.28)
WBC NRBC COR # BLD: 7.92 10*3/MM3 (ref 3.4–10.8)

## 2023-06-12 PROCEDURE — 36415 COLL VENOUS BLD VENIPUNCTURE: CPT

## 2023-06-12 PROCEDURE — 85027 COMPLETE CBC AUTOMATED: CPT

## 2023-06-12 PROCEDURE — 84132 ASSAY OF SERUM POTASSIUM: CPT

## 2023-06-12 PROCEDURE — 93005 ELECTROCARDIOGRAM TRACING: CPT

## 2023-06-12 RX ORDER — SIMVASTATIN 10 MG
10 TABLET ORAL NIGHTLY
COMMUNITY

## 2023-06-12 RX ORDER — CETIRIZINE HYDROCHLORIDE 10 MG/1
10 TABLET ORAL DAILY
COMMUNITY

## 2023-06-12 RX ORDER — FOLIC ACID 0.8 MG
500 TABLET ORAL DAILY
COMMUNITY

## 2023-06-12 NOTE — PAT
An arrival time for procedure was not provided during PAT visit. If patient had any questions or concerns about their arrival time, they were instructed to contact their surgeon/physician.  Additionally, if the patient referred to an arrival time that was acquired from their my chart account, patient was encouraged to verify that time with their surgeon/physician. Arrival times are NOT provided in Pre Admission Testing Department.    Patient denies any current skin issues.

## 2023-06-13 ENCOUNTER — ANESTHESIA EVENT (OUTPATIENT)
Dept: GASTROENTEROLOGY | Facility: HOSPITAL | Age: 68
End: 2023-06-13
Payer: MEDICARE

## 2023-06-13 ENCOUNTER — ANESTHESIA (OUTPATIENT)
Dept: GASTROENTEROLOGY | Facility: HOSPITAL | Age: 68
End: 2023-06-13
Payer: MEDICARE

## 2023-06-13 ENCOUNTER — HOSPITAL ENCOUNTER (OUTPATIENT)
Facility: HOSPITAL | Age: 68
Setting detail: HOSPITAL OUTPATIENT SURGERY
Discharge: HOME | End: 2023-06-13
Attending: INTERNAL MEDICINE | Admitting: INTERNAL MEDICINE
Payer: MEDICARE

## 2023-06-13 VITALS
SYSTOLIC BLOOD PRESSURE: 130 MMHG | TEMPERATURE: 97 F | DIASTOLIC BLOOD PRESSURE: 62 MMHG | RESPIRATION RATE: 16 BRPM | HEART RATE: 75 BPM | OXYGEN SATURATION: 90 %

## 2023-06-13 DIAGNOSIS — K58.0 IRRITABLE BOWEL SYNDROME WITH DIARRHEA: ICD-10-CM

## 2023-06-13 DIAGNOSIS — K22.70 BARRETT'S ESOPHAGUS WITHOUT DYSPLASIA: ICD-10-CM

## 2023-06-13 DIAGNOSIS — D50.9 IRON DEFICIENCY ANEMIA, UNSPECIFIED IRON DEFICIENCY ANEMIA TYPE: ICD-10-CM

## 2023-06-13 LAB — GLUCOSE BLDC GLUCOMTR-MCNC: 104 MG/DL (ref 70–130)

## 2023-06-13 PROCEDURE — 88305 TISSUE EXAM BY PATHOLOGIST: CPT | Performed by: INTERNAL MEDICINE

## 2023-06-13 PROCEDURE — 25010000002 PROPOFOL 10 MG/ML EMULSION: Performed by: NURSE ANESTHETIST, CERTIFIED REGISTERED

## 2023-06-13 PROCEDURE — C1769 GUIDE WIRE: HCPCS | Performed by: INTERNAL MEDICINE

## 2023-06-13 PROCEDURE — 82948 REAGENT STRIP/BLOOD GLUCOSE: CPT | Performed by: INTERNAL MEDICINE

## 2023-06-13 PROCEDURE — 0 LIDOCAINE 1 % SOLUTION: Performed by: NURSE ANESTHETIST, CERTIFIED REGISTERED

## 2023-06-13 DEVICE — DEV CLIP ENDO RESOLUTION360 CONTRL ROT 235CM: Type: IMPLANTABLE DEVICE | Site: STOMACH | Status: FUNCTIONAL

## 2023-06-13 RX ORDER — SODIUM CHLORIDE 0.9 % (FLUSH) 0.9 %
10 SYRINGE (ML) INJECTION AS NEEDED
Status: DISCONTINUED | OUTPATIENT
Start: 2023-06-13 | End: 2023-06-13 | Stop reason: HOSPADM

## 2023-06-13 RX ORDER — SODIUM CHLORIDE, SODIUM LACTATE, POTASSIUM CHLORIDE, CALCIUM CHLORIDE 600; 310; 30; 20 MG/100ML; MG/100ML; MG/100ML; MG/100ML
INJECTION, SOLUTION INTRAVENOUS CONTINUOUS PRN
Status: DISCONTINUED | OUTPATIENT
Start: 2023-06-13 | End: 2023-06-13 | Stop reason: SURG

## 2023-06-13 RX ORDER — ONDANSETRON 2 MG/ML
4 INJECTION INTRAMUSCULAR; INTRAVENOUS ONCE AS NEEDED
Status: DISCONTINUED | OUTPATIENT
Start: 2023-06-13 | End: 2023-06-13 | Stop reason: HOSPADM

## 2023-06-13 RX ORDER — PROPOFOL 10 MG/ML
VIAL (ML) INTRAVENOUS AS NEEDED
Status: DISCONTINUED | OUTPATIENT
Start: 2023-06-13 | End: 2023-06-13 | Stop reason: SURG

## 2023-06-13 RX ORDER — SODIUM CHLORIDE 0.9 % (FLUSH) 0.9 %
10 SYRINGE (ML) INJECTION EVERY 12 HOURS SCHEDULED
Status: DISCONTINUED | OUTPATIENT
Start: 2023-06-13 | End: 2023-06-13 | Stop reason: HOSPADM

## 2023-06-13 RX ORDER — SODIUM CHLORIDE, SODIUM LACTATE, POTASSIUM CHLORIDE, CALCIUM CHLORIDE 600; 310; 30; 20 MG/100ML; MG/100ML; MG/100ML; MG/100ML
9 INJECTION, SOLUTION INTRAVENOUS CONTINUOUS
Status: DISCONTINUED | OUTPATIENT
Start: 2023-06-13 | End: 2023-06-13 | Stop reason: HOSPADM

## 2023-06-13 RX ORDER — LIDOCAINE HYDROCHLORIDE 10 MG/ML
INJECTION, SOLUTION INFILTRATION; PERINEURAL AS NEEDED
Status: DISCONTINUED | OUTPATIENT
Start: 2023-06-13 | End: 2023-06-13 | Stop reason: SURG

## 2023-06-13 RX ADMIN — LIDOCAINE HYDROCHLORIDE 50 MG: 10 INJECTION, SOLUTION INFILTRATION; PERINEURAL at 11:00

## 2023-06-13 RX ADMIN — PROPOFOL 100 MG: 10 INJECTION, EMULSION INTRAVENOUS at 11:48

## 2023-06-13 RX ADMIN — PROPOFOL 100 MG: 10 INJECTION, EMULSION INTRAVENOUS at 12:02

## 2023-06-13 RX ADMIN — PROPOFOL 100 MG: 10 INJECTION, EMULSION INTRAVENOUS at 11:40

## 2023-06-13 RX ADMIN — PROPOFOL 50 MG: 10 INJECTION, EMULSION INTRAVENOUS at 11:06

## 2023-06-13 RX ADMIN — TOPICAL ANESTHETIC 2 SPRAY: 200 SPRAY DENTAL; PERIODONTAL at 10:55

## 2023-06-13 RX ADMIN — PROPOFOL 100 MG: 10 INJECTION, EMULSION INTRAVENOUS at 11:54

## 2023-06-13 RX ADMIN — PROPOFOL 100 MG: 10 INJECTION, EMULSION INTRAVENOUS at 11:20

## 2023-06-13 RX ADMIN — PROPOFOL 50 MG: 10 INJECTION, EMULSION INTRAVENOUS at 11:26

## 2023-06-13 RX ADMIN — SODIUM CHLORIDE, POTASSIUM CHLORIDE, SODIUM LACTATE AND CALCIUM CHLORIDE: 600; 310; 30; 20 INJECTION, SOLUTION INTRAVENOUS at 10:54

## 2023-06-13 RX ADMIN — PROPOFOL 100 MG: 10 INJECTION, EMULSION INTRAVENOUS at 11:00

## 2023-06-13 RX ADMIN — Medication 10 ML: at 10:45

## 2023-06-13 RX ADMIN — SODIUM CHLORIDE, POTASSIUM CHLORIDE, SODIUM LACTATE AND CALCIUM CHLORIDE 9 ML/HR: 600; 310; 30; 20 INJECTION, SOLUTION INTRAVENOUS at 10:45

## 2023-06-13 NOTE — ANESTHESIA POSTPROCEDURE EVALUATION
Patient: Ashely Frederick    Procedure Summary     Date: 06/13/23 Room / Location:  [facility] /  [facility]    Anesthesia Start: 1054 Anesthesia Stop: 1216    Procedures:       COLONOSCOPY      ESOPHAGOGASTRODUODENOSCOPY Diagnosis:       Irritable bowel syndrome with diarrhea      Chacon's esophagus without dysplasia      Iron deficiency anemia, unspecified iron deficiency anemia type      (Irritable bowel syndrome with diarrhea [K58.0])      (Chacon's esophagus without dysplasia [K22.70])      (Iron deficiency anemia, unspecified iron deficiency anemia type [D50.9])    Surgeons: Jose L Neff MD Provider: Suman Phelps MD    Anesthesia Type: general, MAC ASA Status: 3          Anesthesia Type: general, MAC    Vitals  Vitals Value Taken Time   BP     Temp     Pulse 81 06/13/23 1216   Resp     SpO2 91 % 06/13/23 1216   Vitals shown include unvalidated device data.        Post Anesthesia Care and Evaluation    Patient location during evaluation: PACU  Patient participation: complete - patient participated  Level of consciousness: awake and alert  Pain management: adequate    Airway patency: patent  Anesthetic complications: No anesthetic complications  PONV Status: none  Cardiovascular status: hemodynamically stable and acceptable  Respiratory status: nonlabored ventilation, acceptable and nasal cannula  Hydration status: acceptable

## 2023-06-13 NOTE — ANESTHESIA PREPROCEDURE EVALUATION
Anesthesia Evaluation     Patient summary reviewed and Nursing notes reviewed   history of anesthetic complications: PONV  NPO Solid Status: > 8 hours  NPO Liquid Status: > 2 hours           Airway   Mallampati: III  TM distance: >3 FB  Neck ROM: full  Possible difficult intubation  Dental          Pulmonary    (+) asthma (mild prn MDI none recently ),sleep apnea on CPAP,   (-) recent URI, not a smoker    ROS comment: Sats 88% RA   Cardiovascular     ECG reviewed    (+) hypertension, hyperlipidemia,   (-) past MI, dysrhythmias (in past), angina, cardiac stents    ROS comment: ECG NSR   ECHO 2019 EF > 61% LVDD(grade I)  imp relaxation.        Neuro/Psych  (-) seizures, CVA  GI/Hepatic/Renal/Endo    (+)  GERD,  liver disease fatty liver disease, diabetes mellitus (no Rx ) type 2, thyroid problem hypothyroidism    Musculoskeletal     (+) neck pain,   Abdominal    Substance History      OB/GYN          Other            Phys Exam Other: Chipped L incisor   Warned re tooth damage loss               Anesthesia Plan    ASA 3     general and MAC     (TOP POM PFL )  intravenous induction     Anesthetic plan, risks, benefits, and alternatives have been provided, discussed and informed consent has been obtained with: patient.    Plan discussed with CRNA.        CODE STATUS:

## 2023-06-13 NOTE — H&P
[facility]    Referring Provider: Jose L Neff MD    Reason for Consultation: here for egd/colonoscopy    Chief complaint here for egd/colonoscopy    History of present illness:  Ashely Frederick is a 67 y.o. female who presents to inpatient endoscopy for egd/colonoscopy. Follows with amanda greene, last assessed 2 months ago. Reports a h/o cabrera's, ibs, normal screening colon 10 years ago.  Here for egd/colonoscopy.    Allergies:  Erythromycin    Scheduled Meds:  sodium chloride, 10 mL, Intravenous, Q12H         Infusions:  lactated ringers, 9 mL/hr        PRN Meds:  •  sodium chloride    Home Meds:  Medications Prior to Admission   Medication Sig Dispense Refill Last Dose   • albuterol sulfate  (90 Base) MCG/ACT inhaler Inhale 1 puff As Needed for Wheezing or Shortness of Air.   Past Month   • Bacillus Coagulans-Inulin (ALIGN PREBIOTIC-PROBIOTIC PO) Take 1 capsule by mouth Daily.   6/12/2023   • bisoprolol-hydrochlorothiazide (ZIAC) 5-6.25 MG per tablet Take 1 tablet by mouth Daily.   6/12/2023 at 0800   • cetirizine (zyrTEC) 10 MG tablet Take 1 tablet by mouth Daily.   6/12/2023   • dexlansoprazole (DEXILANT) 60 MG capsule Take 1 capsule by mouth Daily. 30 capsule 2 6/12/2023   • Sod Picosulfate-Mag Ox-Cit Acd 10-3.5-12 MG-GM -GM/160ML solution Take 320 mL by mouth Take As Directed. Please follow instructions that were mailed to your home. If you did not receive these instructions please call our office at [phone]. 320 mL 0 6/12/2023 at 2300   • Synthroid 50 MCG tablet Take 1 tablet by mouth Daily. 90 tablet 3 6/13/2023   • aspirin 81 MG EC tablet Take 1 tablet by mouth Daily.   6/11/2023   • losartan (COZAAR) 50 MG tablet Take 1 tablet by mouth Daily.   6/11/2023   • Magnesium 500 MG capsule Take 500 mg by mouth Daily.   6/11/2023   • Restasis 0.05 % ophthalmic emulsion Administer 1 drop to both eyes As Needed (DRY EYES).   6/10/2023   • sertraline (ZOLOFT) 50 MG tablet Take 1  tablet by mouth Daily.   6/11/2023   • simvastatin (ZOCOR) 10 MG tablet Take 1 tablet by mouth Every Night. PT TAKES EOD   6/10/2023   • TURMERIC PO Take 1,000 mg by mouth Daily.   6/11/2023   • vitamin D3 125 MCG (5000 UT) capsule capsule Take 1 capsule by mouth Daily.   6/10/2023       ROS: Review of Systems  All other systems reviewed and are negative.    PAST MED HX: Pt  has a past medical history of Anemia (2023), Asthma, Chacon esophagus (2014), COVID-19 (12/2022), Cyst of thyroid, Diabetes mellitus, Elevated cholesterol, Fatty liver, GERD without esophagitis, Hypertension, Hypothyroidism, Irritable bowel syndrome (1989), PONV (postoperative nausea and vomiting), and Sleep apnea.  PAST SURG HX: Pt  has a past surgical history that includes Hysterectomy (1989); Gallbladder surgery; Tooth extraction; Cholecystectomy (1999); Upper gastrointestinal endoscopy (2020); Colonoscopy (2014); and Total abdominal hysterectomy.  FAM HX: family history includes Arthritis in her brother; Colon cancer in her paternal aunt; Dementia in her mother; Heart disease in her father.  SOC HX: Pt  reports that she has never smoked. She has never used smokeless tobacco. She reports that she does not drink alcohol and does not use drugs.    /76 (BP Location: Left arm, Patient Position: Lying)   Pulse 80   Temp 96.8 °F (36 °C) (Temporal)   Resp 18   SpO2 92%     Physical Exam  Wt Readings from Last 3 Encounters:   06/12/23 89.9 kg (198 lb 3.1 oz)   04/03/23 92 kg (202 lb 12.8 oz)   10/13/22 90.5 kg (199 lb 9.6 oz)   ,body mass index is unknown because there is no height or weight on file.    General Appearance:  Vitals as above. no acute distress  Head/face:  Normocephalic, atraumatic  Eyes:   EOMI, no conjunctivitis or icterus   Nose/Sinuses:  Nares patent bilaterally without discharge or lesions  Mouth/Throat:  Normal oral movements without dyskinesia  Neck:  trachea is midline, no thyromegaly  Lungs:  Normal work of  breathing effort, no overt rales  Heart:  Regular rate, no overt palpable thrill or grade VI M  Abdomen:  Nondistended, no guarding or rebound tenderness  Neurologic:  Alert; no focal deficits; age appropriate behavior and speech  Psychiatric: mood and affect are congruent  Vascular: extremities without edema  Skin: no rash or cyanosis.  High bmi          Results Review:   I reviewed the patient's new clinical results.    Lab Results   Component Value Date    WBC 7.92 06/12/2023    HGB 11.1 (L) 06/12/2023    HCT 38.0 06/12/2023    MCV 84.3 06/12/2023     06/12/2023       Lab Results   Component Value Date    GLUCOSE 140 (H) 11/13/2019    BUN 9 11/13/2019    CREATININE 0.56 (L) 11/13/2019    EGFRIFNONA 109 11/13/2019    BCR 16.1 11/13/2019    CO2 29.4 (H) 11/13/2019    CALCIUM 9.3 11/13/2019    PROTENTOTREF 7.2 07/29/2022    ALBUMIN 3.6 07/29/2022    LABIL2 1.1 07/29/2022    AST 27 11/13/2019    ALT 28 11/13/2019       ASSESSMENTS/PLANS    1.) GERD  2.) HCM  3.) Anemia  4.) h/o ibs-d  5.) h/o cabrera's esophagus  To egd/colonoscopy  The risk of endoscopy was discussed including the risk of anesthesia, bowel perforation, bleeding, missed lesion, infection, and death should a severe complication occur.  The patient determined that the diagnostic benefit outweighed the risk.           I discussed the patient's findings and my recommendations with the patient    Jose L Neff MD  06/13/23  10:42 EDT

## 2023-06-14 LAB
CYTO UR: NORMAL
LAB AP CASE REPORT: NORMAL
LAB AP CLINICAL INFORMATION: NORMAL
PATH REPORT.FINAL DX SPEC: NORMAL
PATH REPORT.GROSS SPEC: NORMAL

## 2023-06-22 LAB
QT INTERVAL: 414 MS
QTC INTERVAL: 434 MS

## 2023-08-23 ENCOUNTER — TRANSCRIBE ORDERS (OUTPATIENT)
Dept: ADMINISTRATIVE | Facility: HOSPITAL | Age: 68
End: 2023-08-23
Payer: MEDICARE

## 2023-08-23 DIAGNOSIS — Z12.31 VISIT FOR SCREENING MAMMOGRAM: Primary | ICD-10-CM

## 2023-09-17 ENCOUNTER — HOSPITAL ENCOUNTER (EMERGENCY)
Facility: HOSPITAL | Age: 68
Discharge: HOME | End: 2023-09-17
Attending: EMERGENCY MEDICINE | Admitting: EMERGENCY MEDICINE
Payer: MEDICARE

## 2023-09-17 ENCOUNTER — APPOINTMENT (OUTPATIENT)
Dept: CT IMAGING | Facility: HOSPITAL | Age: 68
End: 2023-09-17
Payer: MEDICARE

## 2023-09-17 VITALS
WEIGHT: 195 LBS | BODY MASS INDEX: 38.28 KG/M2 | HEIGHT: 60 IN | SYSTOLIC BLOOD PRESSURE: 152 MMHG | OXYGEN SATURATION: 94 % | TEMPERATURE: 98.3 F | RESPIRATION RATE: 17 BRPM | HEART RATE: 81 BPM | DIASTOLIC BLOOD PRESSURE: 84 MMHG

## 2023-09-17 DIAGNOSIS — E87.6 HYPOKALEMIA: ICD-10-CM

## 2023-09-17 DIAGNOSIS — N39.0 URINARY TRACT INFECTION WITHOUT HEMATURIA, SITE UNSPECIFIED: Primary | ICD-10-CM

## 2023-09-17 LAB
ALBUMIN SERPL-MCNC: 4.4 G/DL (ref 3.5–5.2)
ALBUMIN/GLOB SERPL: 1.5 G/DL
ALP SERPL-CCNC: 94 U/L (ref 39–117)
ALT SERPL W P-5'-P-CCNC: 18 U/L (ref 1–33)
ANION GAP SERPL CALCULATED.3IONS-SCNC: 15 MMOL/L (ref 5–15)
AST SERPL-CCNC: 22 U/L (ref 1–32)
BACTERIA UR QL AUTO: ABNORMAL /HPF
BASOPHILS # BLD AUTO: 0.04 10*3/MM3 (ref 0–0.2)
BASOPHILS NFR BLD AUTO: 0.4 % (ref 0–1.5)
BILIRUB SERPL-MCNC: 0.4 MG/DL (ref 0–1.2)
BILIRUB UR QL STRIP: NEGATIVE
BUN SERPL-MCNC: 8 MG/DL (ref 8–23)
BUN/CREAT SERPL: 17 (ref 7–25)
CALCIUM SPEC-SCNC: 9.7 MG/DL (ref 8.6–10.5)
CHLORIDE SERPL-SCNC: 101 MMOL/L (ref 98–107)
CLARITY UR: ABNORMAL
CO2 SERPL-SCNC: 25 MMOL/L (ref 22–29)
COLOR UR: ABNORMAL
CREAT SERPL-MCNC: 0.47 MG/DL (ref 0.57–1)
D-LACTATE SERPL-SCNC: 1.2 MMOL/L (ref 0.5–2)
DEPRECATED RDW RBC AUTO: 58 FL (ref 37–54)
EGFRCR SERPLBLD CKD-EPI 2021: 103.8 ML/MIN/1.73
EOSINOPHIL # BLD AUTO: 0.1 10*3/MM3 (ref 0–0.4)
EOSINOPHIL NFR BLD AUTO: 1.1 % (ref 0.3–6.2)
ERYTHROCYTE [DISTWIDTH] IN BLOOD BY AUTOMATED COUNT: 18.4 % (ref 12.3–15.4)
FLUAV RNA RESP QL NAA+PROBE: NOT DETECTED
FLUBV RNA RESP QL NAA+PROBE: NOT DETECTED
GEN 5 2HR TROPONIN T REFLEX: <6 NG/L
GLOBULIN UR ELPH-MCNC: 3 GM/DL
GLUCOSE SERPL-MCNC: 116 MG/DL (ref 65–99)
GLUCOSE UR STRIP-MCNC: NEGATIVE MG/DL
HCT VFR BLD AUTO: 41.1 % (ref 34–46.6)
HGB BLD-MCNC: 12.2 G/DL (ref 12–15.9)
HGB UR QL STRIP.AUTO: ABNORMAL
HOLD SPECIMEN: NORMAL
HOLD SPECIMEN: NORMAL
HYALINE CASTS UR QL AUTO: ABNORMAL /LPF
IMM GRANULOCYTES # BLD AUTO: 0.04 10*3/MM3 (ref 0–0.05)
IMM GRANULOCYTES NFR BLD AUTO: 0.4 % (ref 0–0.5)
KETONES UR QL STRIP: ABNORMAL
LEUKOCYTE ESTERASE UR QL STRIP.AUTO: ABNORMAL
LIPASE SERPL-CCNC: 12 U/L (ref 13–60)
LYMPHOCYTES # BLD AUTO: 1.32 10*3/MM3 (ref 0.7–3.1)
LYMPHOCYTES NFR BLD AUTO: 14.4 % (ref 19.6–45.3)
MAGNESIUM SERPL-MCNC: 2 MG/DL (ref 1.6–2.4)
MCH RBC QN AUTO: 25.7 PG (ref 26.6–33)
MCHC RBC AUTO-ENTMCNC: 29.7 G/DL (ref 31.5–35.7)
MCV RBC AUTO: 86.5 FL (ref 79–97)
MONOCYTES # BLD AUTO: 0.6 10*3/MM3 (ref 0.1–0.9)
MONOCYTES NFR BLD AUTO: 6.5 % (ref 5–12)
NEUTROPHILS NFR BLD AUTO: 7.08 10*3/MM3 (ref 1.7–7)
NEUTROPHILS NFR BLD AUTO: 77.2 % (ref 42.7–76)
NITRITE UR QL STRIP: NEGATIVE
NRBC BLD AUTO-RTO: 0 /100 WBC (ref 0–0.2)
PH UR STRIP.AUTO: 5.5 [PH] (ref 5–8)
PLATELET # BLD AUTO: 322 10*3/MM3 (ref 140–450)
PMV BLD AUTO: 8.8 FL (ref 6–12)
POTASSIUM SERPL-SCNC: 3.4 MMOL/L (ref 3.5–5.2)
PROT SERPL-MCNC: 7.4 G/DL (ref 6–8.5)
PROT UR QL STRIP: NEGATIVE
QT INTERVAL: 414 MS
QTC INTERVAL: 459 MS
RBC # BLD AUTO: 4.75 10*6/MM3 (ref 3.77–5.28)
RBC # UR STRIP: ABNORMAL /HPF
REF LAB TEST METHOD: ABNORMAL
SARS-COV-2 RNA RESP QL NAA+PROBE: NOT DETECTED
SODIUM SERPL-SCNC: 141 MMOL/L (ref 136–145)
SP GR UR STRIP: 1.03 (ref 1–1.03)
SQUAMOUS #/AREA URNS HPF: ABNORMAL /HPF
TROPONIN T DELTA: NORMAL
TROPONIN T SERPL HS-MCNC: 7 NG/L
UROBILINOGEN UR QL STRIP: ABNORMAL
WBC # UR STRIP: ABNORMAL /HPF
WBC NRBC COR # BLD: 9.18 10*3/MM3 (ref 3.4–10.8)
WHOLE BLOOD HOLD COAG: NORMAL
WHOLE BLOOD HOLD SPECIMEN: NORMAL

## 2023-09-17 PROCEDURE — 83605 ASSAY OF LACTIC ACID: CPT

## 2023-09-17 PROCEDURE — 99284 EMERGENCY DEPT VISIT MOD MDM: CPT

## 2023-09-17 PROCEDURE — 74176 CT ABD & PELVIS W/O CONTRAST: CPT

## 2023-09-17 PROCEDURE — 36415 COLL VENOUS BLD VENIPUNCTURE: CPT

## 2023-09-17 PROCEDURE — 85025 COMPLETE CBC W/AUTO DIFF WBC: CPT

## 2023-09-17 PROCEDURE — 83735 ASSAY OF MAGNESIUM: CPT

## 2023-09-17 PROCEDURE — 84484 ASSAY OF TROPONIN QUANT: CPT

## 2023-09-17 PROCEDURE — 87086 URINE CULTURE/COLONY COUNT: CPT

## 2023-09-17 PROCEDURE — 25010000002 METRONIDAZOLE 500 MG/100ML SOLUTION

## 2023-09-17 PROCEDURE — 87040 BLOOD CULTURE FOR BACTERIA: CPT

## 2023-09-17 PROCEDURE — 87636 SARSCOV2 & INF A&B AMP PRB: CPT

## 2023-09-17 PROCEDURE — 80053 COMPREHEN METABOLIC PANEL: CPT

## 2023-09-17 PROCEDURE — 0 CEFEPIME PER 500 MG

## 2023-09-17 PROCEDURE — 81001 URINALYSIS AUTO W/SCOPE: CPT

## 2023-09-17 PROCEDURE — 96365 THER/PROPH/DIAG IV INF INIT: CPT

## 2023-09-17 PROCEDURE — 96367 TX/PROPH/DG ADDL SEQ IV INF: CPT

## 2023-09-17 PROCEDURE — 83690 ASSAY OF LIPASE: CPT

## 2023-09-17 PROCEDURE — 25010000002 ONDANSETRON PER 1 MG

## 2023-09-17 PROCEDURE — 96375 TX/PRO/DX INJ NEW DRUG ADDON: CPT

## 2023-09-17 PROCEDURE — 93005 ELECTROCARDIOGRAM TRACING: CPT

## 2023-09-17 RX ORDER — SIMETHICONE 80 MG
80 TABLET,CHEWABLE ORAL EVERY 6 HOURS PRN
Qty: 14 TABLET | Refills: 0 | Status: SHIPPED | OUTPATIENT
Start: 2023-09-17

## 2023-09-17 RX ORDER — SODIUM CHLORIDE 0.9 % (FLUSH) 0.9 %
10 SYRINGE (ML) INJECTION AS NEEDED
Status: DISCONTINUED | OUTPATIENT
Start: 2023-09-17 | End: 2023-09-17 | Stop reason: HOSPADM

## 2023-09-17 RX ORDER — SIMETHICONE 80 MG
80 TABLET,CHEWABLE ORAL ONCE
Status: COMPLETED | OUTPATIENT
Start: 2023-09-17 | End: 2023-09-17

## 2023-09-17 RX ORDER — ONDANSETRON 2 MG/ML
4 INJECTION INTRAMUSCULAR; INTRAVENOUS ONCE
Status: COMPLETED | OUTPATIENT
Start: 2023-09-17 | End: 2023-09-17

## 2023-09-17 RX ORDER — METRONIDAZOLE 500 MG/100ML
500 INJECTION, SOLUTION INTRAVENOUS ONCE
Status: COMPLETED | OUTPATIENT
Start: 2023-09-17 | End: 2023-09-17

## 2023-09-17 RX ORDER — POTASSIUM CHLORIDE 20 MEQ/1
40 TABLET, EXTENDED RELEASE ORAL ONCE
Status: COMPLETED | OUTPATIENT
Start: 2023-09-17 | End: 2023-09-17

## 2023-09-17 RX ORDER — CEFDINIR 300 MG/1
300 CAPSULE ORAL 2 TIMES DAILY
Qty: 10 CAPSULE | Refills: 0 | Status: SHIPPED | OUTPATIENT
Start: 2023-09-17 | End: 2023-09-22

## 2023-09-17 RX ADMIN — METRONIDAZOLE 500 MG: 500 INJECTION, SOLUTION INTRAVENOUS at 11:05

## 2023-09-17 RX ADMIN — CEFEPIME 2000 MG: 2 INJECTION, POWDER, FOR SOLUTION INTRAVENOUS at 10:15

## 2023-09-17 RX ADMIN — SIMETHICONE 80 MG: 80 TABLET, CHEWABLE ORAL at 13:02

## 2023-09-17 RX ADMIN — SODIUM CHLORIDE 1365 ML: 9 INJECTION, SOLUTION INTRAVENOUS at 09:56

## 2023-09-17 RX ADMIN — ONDANSETRON 4 MG: 2 INJECTION INTRAMUSCULAR; INTRAVENOUS at 09:58

## 2023-09-17 RX ADMIN — POTASSIUM CHLORIDE 40 MEQ: 1500 TABLET, EXTENDED RELEASE ORAL at 10:59

## 2023-09-17 NOTE — ED PROVIDER NOTES
"Subjective   History of Present Illness  67-year-old female presents to [facility] ED with chief complaint of RLE abdominal pain/right pelvic pain, nausea, and diarrhea which began approximately 2 days ago. Patient states that she also has some pain in between her shoulder blades. Denies any chest pain, jaw pain/neck pain, shortness of breath or palpitations. She states that she has had kidney stones in the past, however \"this pain is different\". She reports subjective fever and chills along with diaphoresis. Denies vomiting or blood in stool. States that she just had a colonoscopy in June 2023 and other than a benign polyp and diverticulosis, colonoscopy was unremarkable. Patient states that she has been anxious due to her symptoms and states that she has had IBS symptoms in the past, however this is much worse.  CT of the abdomen pelvis unremarkable.    History provided by:  Patient   used: No      Review of Systems   Constitutional:  Positive for appetite change, chills, diaphoresis and fever (subjective). Negative for fatigue.   HENT:  Negative for congestion, sore throat and trouble swallowing.    Respiratory:  Negative for cough, shortness of breath and wheezing.    Cardiovascular:  Negative for chest pain, palpitations and leg swelling.   Gastrointestinal:  Positive for abdominal distention, abdominal pain (RLQ), diarrhea and nausea. Negative for constipation and vomiting.   Genitourinary:  Positive for pelvic pain (right). Negative for difficulty urinating, flank pain, frequency and urgency.   Musculoskeletal:  Positive for back pain (between shoulder blades). Negative for gait problem, neck pain and neck stiffness.   Skin:  Negative for rash.   Neurological:  Negative for dizziness, tremors, seizures, syncope, facial asymmetry, speech difficulty, weakness, light-headedness, numbness and headaches.     Past Medical History:   Diagnosis Date    Anemia 2023    Asthma     [place]acon esophagus 2014    " COVID-19 12/2022    Cyst of thyroid     Diabetes mellitus     Elevated cholesterol     Fatty liver     GERD without esophagitis     Hypertension     Hypothyroidism     Irritable bowel syndrome 1989    PONV (postoperative nausea and vomiting)     Sleep apnea     CPAP       Allergies   Allergen Reactions    Erythromycin GI Intolerance       Past Surgical History:   Procedure Laterality Date    CHOLECYSTECTOMY  1999    COLONOSCOPY  2014    COLONOSCOPY N/A 6/13/2023    Procedure: COLONOSCOPY;  Surgeon: Jose L Neff MD;  Location:  [facility];  Service: Gastroenterology;  Laterality: N/A;    ENDOSCOPY N/A 6/13/2023    Procedure: ESOPHAGOGASTRODUODENOSCOPY;  Surgeon: Jose L Neff MD;  Location:  [facility];  Service: Gastroenterology;  Laterality: N/A;    GALLBLADDER SURGERY      HYSTERECTOMY  1989    PARTIAL    TOOTH EXTRACTION      TOTAL ABDOMINAL HYSTERECTOMY      EARLY 1900'S    UPPER GASTROINTESTINAL ENDOSCOPY  2020       Family History   Problem Relation Age of Onset    Dementia Mother     Heart disease Father     Arthritis Brother     Colon cancer Paternal Aunt     Breast cancer Neg Hx        Social History     Socioeconomic History    Marital status:    Tobacco Use    Smoking status: Never    Smokeless tobacco: Never   Vaping Use    Vaping Use: Never used   Substance and Sexual Activity    Alcohol use: No    Drug use: No    Sexual activity: Not Currently           Objective   Physical Exam  Vitals reviewed.   Constitutional:       General: She is awake. She is not in acute distress.     Appearance: She is not ill-appearing or diaphoretic.      Comments: Room air.   HENT:      Head: Normocephalic and atraumatic.      Mouth/Throat:      Mouth: Mucous membranes are moist.      Pharynx: Oropharynx is clear.   Eyes:      Extraocular Movements: Extraocular movements intact.      Pupils: Pupils are equal, round, and reactive to light.   Cardiovascular:      Rate and Rhythm: Normal rate  and regular rhythm.      Pulses: Normal pulses.           Dorsalis pedis pulses are 2+ on the right side and 2+ on the left side.      Heart sounds: Normal heart sounds. No murmur heard.    No friction rub.   Pulmonary:      Effort: Pulmonary effort is normal. No accessory muscle usage, respiratory distress or retractions.      Breath sounds: No wheezing, rhonchi or rales.   Abdominal:      General: Bowel sounds are normal. There is no distension.      Palpations: Abdomen is soft.      Tenderness: There is abdominal tenderness in the right lower quadrant and epigastric area. There is no guarding or rebound. Negative signs include Lucero's sign.      Hernia: No hernia is present.   Musculoskeletal:      Cervical back: Neck supple. No rigidity.      Right lower leg: No edema.      Left lower leg: No edema.   Skin:     General: Skin is warm and dry.      Capillary Refill: Capillary refill takes 2 to 3 seconds.   Neurological:      Mental Status: She is alert and oriented to person, place, and time. Mental status is at baseline.      Cranial Nerves: No dysarthria or facial asymmetry.      Sensory: Sensation is intact.      Motor: No weakness or tremor.   Psychiatric:         Attention and Perception: Attention normal.         Mood and Affect: Mood normal.         Speech: Speech normal.         Behavior: Behavior normal. Behavior is cooperative.         Thought Content: Thought content normal.         Cognition and Memory: Cognition normal.         Judgment: Judgment normal.       Procedures           ED Course           Results for orders placed or performed during the hospital encounter of 09/17/23   COVID-19 and FLU A/B PCR - Swab, Nasopharynx    Specimen: Nasopharynx; Swab   Result Value Ref Range    COVID19 Not Detected Not Detected - Ref. Range    Influenza A PCR Not Detected Not Detected    Influenza B PCR Not Detected Not Detected   Comprehensive Metabolic Panel    Specimen: Arm, Left; Blood   Result Value Ref  Range    Glucose 116 (H) 65 - 99 mg/dL    BUN 8 8 - 23 mg/dL    Creatinine 0.47 (L) 0.57 - 1.00 mg/dL    Sodium 141 136 - 145 mmol/L    Potassium 3.4 (L) 3.5 - 5.2 mmol/L    Chloride 101 98 - 107 mmol/L    CO2 25.0 22.0 - 29.0 mmol/L    Calcium 9.7 8.6 - 10.5 mg/dL    Total Protein 7.4 6.0 - 8.5 g/dL    Albumin 4.4 3.5 - 5.2 g/dL    ALT (SGPT) 18 1 - 33 U/L    AST (SGOT) 22 1 - 32 U/L    Alkaline Phosphatase 94 39 - 117 U/L    Total Bilirubin 0.4 0.0 - 1.2 mg/dL    Globulin 3.0 gm/dL    A/G Ratio 1.5 g/dL    BUN/Creatinine Ratio 17.0 7.0 - 25.0    Anion Gap 15.0 5.0 - 15.0 mmol/L    eGFR 103.8 >60.0 mL/min/1.73   Lipase    Specimen: Arm, Left; Blood   Result Value Ref Range    Lipase 12 (L) 13 - 60 U/L   Urinalysis With Microscopic If Indicated (No Culture) - Urine, Clean Catch    Specimen: Urine, Clean Catch   Result Value Ref Range    Color, UA Dark Yellow (A) Yellow, Straw    Appearance, UA Cloudy (A) Clear    pH, UA 5.5 5.0 - 8.0    Specific Gravity, UA 1.026 1.005 - 1.030    Glucose, UA Negative Negative    Ketones, UA 40 mg/dL (2+) (A) Negative    Bilirubin, UA Negative Negative    Blood, UA Trace (A) Negative    Protein, UA Negative Negative    Leuk Esterase, UA Small (1+) (A) Negative    Nitrite, UA Negative Negative    Urobilinogen, UA 1.0 E.U./dL 0.2 - 1.0 E.U./dL   Lactic Acid, Plasma    Specimen: Arm, Left; Blood   Result Value Ref Range    Lactate 1.2 0.5 - 2.0 mmol/L   CBC Auto Differential    Specimen: Arm, Left; Blood   Result Value Ref Range    WBC 9.18 3.40 - 10.80 10*3/mm3    RBC 4.75 3.77 - 5.28 10*6/mm3    Hemoglobin 12.2 12.0 - 15.9 g/dL    Hematocrit 41.1 34.0 - 46.6 %    MCV 86.5 79.0 - 97.0 fL    MCH 25.7 (L) 26.6 - 33.0 pg    MCHC 29.7 (L) 31.5 - 35.7 g/dL    RDW 18.4 (H) 12.3 - 15.4 %    RDW-SD 58.0 (H) 37.0 - 54.0 fl    MPV 8.8 6.0 - 12.0 fL    Platelets 322 140 - 450 10*3/mm3    Neutrophil % 77.2 (H) 42.7 - 76.0 %    Lymphocyte % 14.4 (L) 19.6 - 45.3 %    Monocyte % 6.5 5.0 - 12.0 %     Eosinophil % 1.1 0.3 - 6.2 %    Basophil % 0.4 0.0 - 1.5 %    Immature Grans % 0.4 0.0 - 0.5 %    Neutrophils, Absolute 7.08 (H) 1.70 - 7.00 10*3/mm3    Lymphocytes, Absolute 1.32 0.70 - 3.10 10*3/mm3    Monocytes, Absolute 0.60 0.10 - 0.90 10*3/mm3    Eosinophils, Absolute 0.10 0.00 - 0.40 10*3/mm3    Basophils, Absolute 0.04 0.00 - 0.20 10*3/mm3    Immature Grans, Absolute 0.04 0.00 - 0.05 10*3/mm3    nRBC 0.0 0.0 - 0.2 /100 WBC   High Sensitivity Troponin T    Specimen: Arm, Left; Blood   Result Value Ref Range    HS Troponin T 7 <10 ng/L   Magnesium    Specimen: Arm, Left; Blood   Result Value Ref Range    Magnesium 2.0 1.6 - 2.4 mg/dL   Urinalysis, Microscopic Only - Urine, Clean Catch    Specimen: Urine, Clean Catch   Result Value Ref Range    RBC, UA 3-5 (A) None Seen, 0-2 /HPF    WBC, UA 13-20 (A) None Seen, 0-2 /HPF    Bacteria, UA 3+ (A) None Seen /HPF    Squamous Epithelial Cells, UA 21-30 (A) None Seen, 0-2 /HPF    Hyaline Casts, UA 3-6 None Seen /LPF    Methodology Automated Microscopy    High Sensitivity Troponin T 2Hr    Specimen: Arm, Left; Blood   Result Value Ref Range    HS Troponin T <6 <10 ng/L    Troponin T Delta     ECG 12 Lead Other; Anginal equivalent, female   Result Value Ref Range    QT Interval 414 ms    QTC Interval 459 ms   Green Top (Gel)   Result Value Ref Range    Extra Tube Hold for add-ons.    Lavender Top   Result Value Ref Range    Extra Tube hold for add-on    Gold Top - SST   Result Value Ref Range    Extra Tube Hold for add-ons.    Light Blue Top   Result Value Ref Range    Extra Tube Hold for add-ons.                           CT Abdomen Pelvis Stone Protocol    Result Date: 9/17/2023   1. No obstructive uropathy or urolithiasis  2.Fatty infiltration of the liver       This report was finalized on 9/17/2023 10:37 AM by Dr. Chito Beltrán MD.              Medical Decision Making  67-year-old female presents to [facility] ED with chief complaint of RLE abdominal pain/right pelvic  "pain, nausea, and diarrhea which began approximately 2 days ago. Patient states that she also has some pain in between her shoulder blades. Denies any chest pain, jaw pain/neck pain, shortness of breath or palpitations. She states that she has had kidney stones in the past, however \"this pain is different\". She reports subjective fever and chills along with diaphoresis. Denies vomiting or blood in stool. States that she just had a colonoscopy in June 2023 and other than a benign polyp and diverticulosis, colonoscopy was unremarkable. Patient states that she has been anxious due to her symptoms and states that she has had IBS symptoms in the past, however this is much worse.  CT of the abdomen pelvis unremarkable.  UA concerning for UTI, although contaminated with squamous cells.  Urine culture added.  Patient currently afebrile.  Vital signs stable.  Safe to discharge home from ED standpoint at this time with Omnicef twice daily x5 days for UTI treatment.  Encourage patient to follow-up with PCP within 1 week for reevaluation/ED visit follow-up.  Voiced agreement understanding with no further questions or concerns at this time.    Problems Addressed:  Hypokalemia: complicated acute illness or injury  Urinary tract infection without hematuria, site unspecified: complicated acute illness or injury    Amount and/or Complexity of Data Reviewed  Labs: ordered.  Radiology: ordered.  ECG/medicine tests: ordered.    Risk  OTC drugs.  Prescription drug management.        Final diagnoses:   Urinary tract infection without hematuria, site unspecified   Hypokalemia       ED Disposition  ED Disposition       ED Disposition   Discharge    Condition   Stable    Comment   --               Beau Brasher MD  [address] [facility] [address]  [phone]    In 1 week  ED follow up, UTI    [facility] EMERGENCY DEPARTMENT  [address] [place] [id number] [phone]    If symptoms worsen       "   Medication List        New Prescriptions      cefdinir 300 MG capsule  Commonly known as: OMNICEF  Take 1 capsule by mouth 2 (Two) Times a Day for 5 days.     simethicone 80 MG chewable tablet  Commonly known as: Gas-X  Chew 1 tablet Every 6 (Six) Hours As Needed for Flatulence.               Where to Get Your Medications        These medications were sent to [facility][id number] - [address] AT [facility] [address] [facility] [address] - [phone]  - [phone] [address] [address]      Phone: [phone]   cefdinir 300 MG capsule  simethicone 80 MG chewable tablet            Shae Hernandez, CAITLIN  09/17/23 [id number]

## 2023-09-18 LAB — BACTERIA SPEC AEROBE CULT: NORMAL

## 2023-09-19 ENCOUNTER — PATIENT OUTREACH (OUTPATIENT)
Dept: CASE MANAGEMENT | Facility: OTHER | Age: 68
End: 2023-09-19
Payer: MEDICARE

## 2023-09-19 NOTE — OUTREACH NOTE
AMBULATORY CASE MANAGEMENT NOTE    Name and Relationship of Patient/Support Person: Yoly, Ashely H - Self    Patient Outreach    Patient in ED 9/17/23, dx'd with UTI and hypokalemia. Pt confirms picking up prescriptions, cefdinir and simethicone. Pt reports still not feeling very well, experiencing abdominal pain. Pt began taking medication yesterday, v/u that symptoms should improve after taking a little while longer. Pt reports not experiencing typical symptoms of UTI (burning with urination, frequency), and only felt abdominal pain. ACM provided education; advised UTI can often present with atypical symptoms or no symptoms at all. Pt has hx diabetes, HTN, fatty liver, sleep apnea; is agreeable for future HRCM calls. Care plan created, goals reviewed. Future outreach scheduled for care plan progression.    Adult Patient Profile  Questions/Answers      Flowsheet Row Most Recent Value   Symptoms/Conditions Managed at Home cardiovascular   Barriers to Managing Health none   Cardiovascular Symptoms/Conditions hypertension   Cardiovascular Management Strategies medication therapy, routine screening   Cardiovascular Self-Management Outcome 4 (good)   Identifying Health Goals keep illness under control   Taking Medications Not Prescribed no   Missed Doses of Prescribed Medications During Past Week no   Taken Prescribed Medications at Different Time or Schedule During Past Week no   Taken More or Less Medication Than Prescribed no   Barriers to Taking Medication as Prescribed none          SDOH questionnaire sent via patient's active [facility].    Education Documentation  Unresolved/Worsening Symptoms, taught by Katelynn Rahman, RN at 9/19/2023 12:39 PM.  Learner: Patient  Readiness: Acceptance  Method: Explanation  Response: Verbalizes Understanding    Provider Follow-Up, taught by Katelynn Rahman, RN at 9/19/2023 12:39 PM.  Learner: Patient  Readiness: Acceptance  Method: Explanation  Response: Verbalizes  Understanding    Prevention Measures, taught by Katelynn Rahman, RN at 9/19/2023 12:39 PM.  Learner: Patient  Readiness: Acceptance  Method: Explanation  Response: Verbalizes Understanding    Medication Management, taught by Katelynn Rahman, RN at 9/19/2023 12:39 PM.  Learner: Patient  Readiness: Acceptance  Method: Explanation  Response: Verbalizes Understanding    Fluid Intake, taught by Katelynn Rahman, RN at 9/19/2023 12:39 PM.  Learner: Patient  Readiness: Acceptance  Method: Explanation  Response: Verbalizes Understanding    Signs/Symptoms, taught by Katelynn Rahman, RN at 9/19/2023 12:39 PM.  Learner: Patient  Readiness: Acceptance  Method: Explanation  Response: Verbalizes Understanding          Katelynn PACK  Ambulatory Case Management    9/19/2023, 12:40 EDT

## 2023-09-22 LAB
BACTERIA SPEC AEROBE CULT: NORMAL
BACTERIA SPEC AEROBE CULT: NORMAL

## 2023-10-03 ENCOUNTER — PATIENT OUTREACH (OUTPATIENT)
Dept: CASE MANAGEMENT | Facility: OTHER | Age: 68
End: 2023-10-03
Payer: MEDICARE

## 2023-10-05 ENCOUNTER — HOSPITAL ENCOUNTER (OUTPATIENT)
Dept: MAMMOGRAPHY | Facility: HOSPITAL | Age: 68
Discharge: HOME | End: 2023-10-05
Admitting: INTERNAL MEDICINE
Payer: MEDICARE

## 2023-10-05 DIAGNOSIS — Z12.31 VISIT FOR SCREENING MAMMOGRAM: ICD-10-CM

## 2023-10-05 PROCEDURE — 77067 SCR MAMMO BI INCL CAD: CPT

## 2023-10-05 PROCEDURE — 77063 BREAST TOMOSYNTHESIS BI: CPT

## 2023-10-12 ENCOUNTER — OFFICE VISIT (OUTPATIENT)
Dept: ENDOCRINOLOGY | Facility: CLINIC | Age: 68
End: 2023-10-12
Payer: MEDICARE

## 2023-10-12 VITALS
HEART RATE: 75 BPM | BODY MASS INDEX: 37.5 KG/M2 | OXYGEN SATURATION: 94 % | SYSTOLIC BLOOD PRESSURE: 132 MMHG | WEIGHT: 191 LBS | DIASTOLIC BLOOD PRESSURE: 60 MMHG | HEIGHT: 60 IN

## 2023-10-12 DIAGNOSIS — E03.9 ACQUIRED HYPOTHYROIDISM: Primary | ICD-10-CM

## 2023-10-12 LAB — TSH SERPL DL<=0.05 MIU/L-ACNC: 1.86 UIU/ML (ref 0.27–4.2)

## 2023-10-12 PROCEDURE — 3075F SYST BP GE 130 - 139MM HG: CPT | Performed by: INTERNAL MEDICINE

## 2023-10-12 PROCEDURE — 3078F DIAST BP <80 MM HG: CPT | Performed by: INTERNAL MEDICINE

## 2023-10-12 PROCEDURE — 84443 ASSAY THYROID STIM HORMONE: CPT | Performed by: INTERNAL MEDICINE

## 2023-10-12 PROCEDURE — 99213 OFFICE O/P EST LOW 20 MIN: CPT | Performed by: INTERNAL MEDICINE

## 2023-10-12 PROCEDURE — 1160F RVW MEDS BY RX/DR IN RCRD: CPT | Performed by: INTERNAL MEDICINE

## 2023-10-12 PROCEDURE — 1159F MED LIST DOCD IN RCRD: CPT | Performed by: INTERNAL MEDICINE

## 2023-10-12 RX ORDER — LEVOTHYROXINE SODIUM 50 MCG
50 TABLET ORAL DAILY
Qty: 90 TABLET | Refills: 3 | Status: SHIPPED | OUTPATIENT
Start: 2023-10-12

## 2023-10-12 NOTE — PROGRESS NOTES
"     Office Note      Date: 10/12/2023  Patient Name: Ashely rFederick  MRN: [id number]  : 1955    Chief Complaint   Patient presents with    Acquired hypothyroidism       History of Present Illness:   Ashely Frederick is a 67 y.o. female who presents for Acquired hypothyroidism  .   Current rx: bno synthroid 50 mcg per day     Changes in history: went to er about a onth ago with uti  Questions /problems:none    Subjective          Review of Systems:   Review of Systems   Constitutional:  Negative for fatigue and unexpected weight change.   HENT:  Positive for voice change. Negative for trouble swallowing.    Eyes:  Negative for visual disturbance.   Cardiovascular:  Negative for palpitations.   Endocrine: Negative for cold intolerance and heat intolerance.   Neurological:  Negative for tremors.   Psychiatric/Behavioral:  Negative for agitation and dysphoric mood. The patient is not nervous/anxious.        The following portions of the patient's history were reviewed and updated as appropriate: allergies, current medications, past family history, past medical history, past social history, past surgical history, and problem list.    Objective     Visit Vitals  /60 (BP Location: Left arm, Patient Position: Sitting, Cuff Size: Adult)   Pulse 75   Ht 152.4 cm (60\")   Wt 86.6 kg (191 lb)   SpO2 94%   BMI 37.30 kg/mý           Physical Exam:  Physical Exam  Vitals reviewed.   Constitutional:       Appearance: Normal appearance.   Eyes:      Extraocular Movements: Extraocular movements intact.   Neck:      Comments: No palpable thyroid abnormality   Lymphadenopathy:      Cervical: No cervical adenopathy.   Skin:     General: Skin is warm.   Neurological:      Mental Status: She is alert.   Psychiatric:         Mood and Affect: Mood normal.         Assessment / Plan      Assessment & Plan:  Problem List Items Addressed This Visit          Other    Acquired hypothyroidism - Primary    Current Assessment & Plan     " Clinically euthyroid. Thyroid levels ordered. Medication to be adjusted accordingly.          Relevant Medications    Synthroid 50 MCG tablet    Other Relevant Orders    TSH        Familia Grover MD   10/12/2023

## 2023-10-31 ENCOUNTER — TRANSCRIBE ORDERS (OUTPATIENT)
Dept: ADMINISTRATIVE | Facility: HOSPITAL | Age: 68
End: 2023-10-31
Payer: MEDICARE

## 2023-10-31 DIAGNOSIS — R10.9 RIGHT FLANK PAIN: ICD-10-CM

## 2023-10-31 DIAGNOSIS — R30.0 DYSURIA: Primary | ICD-10-CM

## 2023-11-01 ENCOUNTER — HOSPITAL ENCOUNTER (EMERGENCY)
Facility: HOSPITAL | Age: 68
Discharge: HOME | End: 2023-11-01
Attending: STUDENT IN AN ORGANIZED HEALTH CARE EDUCATION/TRAINING PROGRAM | Admitting: STUDENT IN AN ORGANIZED HEALTH CARE EDUCATION/TRAINING PROGRAM
Payer: MEDICARE

## 2023-11-01 ENCOUNTER — APPOINTMENT (OUTPATIENT)
Dept: CT IMAGING | Facility: HOSPITAL | Age: 68
End: 2023-11-01
Payer: MEDICARE

## 2023-11-01 VITALS
DIASTOLIC BLOOD PRESSURE: 70 MMHG | BODY MASS INDEX: 36.91 KG/M2 | TEMPERATURE: 98.3 F | HEART RATE: 80 BPM | HEIGHT: 60 IN | OXYGEN SATURATION: 99 % | WEIGHT: 188 LBS | SYSTOLIC BLOOD PRESSURE: 144 MMHG | RESPIRATION RATE: 18 BRPM

## 2023-11-01 DIAGNOSIS — M54.50 ACUTE RIGHT-SIDED LOW BACK PAIN, UNSPECIFIED WHETHER SCIATICA PRESENT: Primary | ICD-10-CM

## 2023-11-01 LAB
ALBUMIN SERPL-MCNC: 4.4 G/DL (ref 3.5–5.2)
ALBUMIN/GLOB SERPL: 1.5 G/DL
ALP SERPL-CCNC: 95 U/L (ref 39–117)
ALT SERPL W P-5'-P-CCNC: 17 U/L (ref 1–33)
ANION GAP SERPL CALCULATED.3IONS-SCNC: 13.3 MMOL/L (ref 5–15)
AST SERPL-CCNC: 22 U/L (ref 1–32)
BASOPHILS # BLD AUTO: 0.03 10*3/MM3 (ref 0–0.2)
BASOPHILS NFR BLD AUTO: 0.4 % (ref 0–1.5)
BILIRUB SERPL-MCNC: 0.4 MG/DL (ref 0–1.2)
BILIRUB UR QL STRIP: NEGATIVE
BUN SERPL-MCNC: 7 MG/DL (ref 8–23)
BUN/CREAT SERPL: 10.8 (ref 7–25)
CALCIUM SPEC-SCNC: 9.8 MG/DL (ref 8.6–10.5)
CHLORIDE SERPL-SCNC: 101 MMOL/L (ref 98–107)
CLARITY UR: CLEAR
CO2 SERPL-SCNC: 26.7 MMOL/L (ref 22–29)
COLOR UR: ABNORMAL
CREAT SERPL-MCNC: 0.65 MG/DL (ref 0.57–1)
D-LACTATE SERPL-SCNC: 1.8 MMOL/L (ref 0.5–2)
DEPRECATED RDW RBC AUTO: 54.9 FL (ref 37–54)
EGFRCR SERPLBLD CKD-EPI 2021: 96 ML/MIN/1.73
EOSINOPHIL # BLD AUTO: 0.03 10*3/MM3 (ref 0–0.4)
EOSINOPHIL NFR BLD AUTO: 0.4 % (ref 0.3–6.2)
ERYTHROCYTE [DISTWIDTH] IN BLOOD BY AUTOMATED COUNT: 17.2 % (ref 12.3–15.4)
GLOBULIN UR ELPH-MCNC: 3 GM/DL
GLUCOSE SERPL-MCNC: 136 MG/DL (ref 65–99)
GLUCOSE UR STRIP-MCNC: NEGATIVE MG/DL
HCT VFR BLD AUTO: 39.1 % (ref 34–46.6)
HGB BLD-MCNC: 11.9 G/DL (ref 12–15.9)
HGB UR QL STRIP.AUTO: NEGATIVE
HOLD SPECIMEN: NORMAL
HOLD SPECIMEN: NORMAL
IMM GRANULOCYTES # BLD AUTO: 0.03 10*3/MM3 (ref 0–0.05)
IMM GRANULOCYTES NFR BLD AUTO: 0.4 % (ref 0–0.5)
KETONES UR QL STRIP: ABNORMAL
LEUKOCYTE ESTERASE UR QL STRIP.AUTO: NEGATIVE
LIPASE SERPL-CCNC: 15 U/L (ref 13–60)
LYMPHOCYTES # BLD AUTO: 0.92 10*3/MM3 (ref 0.7–3.1)
LYMPHOCYTES NFR BLD AUTO: 11.8 % (ref 19.6–45.3)
MCH RBC QN AUTO: 26.3 PG (ref 26.6–33)
MCHC RBC AUTO-ENTMCNC: 30.4 G/DL (ref 31.5–35.7)
MCV RBC AUTO: 86.3 FL (ref 79–97)
MONOCYTES # BLD AUTO: 0.45 10*3/MM3 (ref 0.1–0.9)
MONOCYTES NFR BLD AUTO: 5.8 % (ref 5–12)
NEUTROPHILS NFR BLD AUTO: 6.31 10*3/MM3 (ref 1.7–7)
NEUTROPHILS NFR BLD AUTO: 81.2 % (ref 42.7–76)
NITRITE UR QL STRIP: NEGATIVE
NRBC BLD AUTO-RTO: 0 /100 WBC (ref 0–0.2)
PH UR STRIP.AUTO: 5.5 [PH] (ref 5–8)
PLATELET # BLD AUTO: 331 10*3/MM3 (ref 140–450)
PMV BLD AUTO: 8.9 FL (ref 6–12)
POTASSIUM SERPL-SCNC: 3.5 MMOL/L (ref 3.5–5.2)
PROT SERPL-MCNC: 7.4 G/DL (ref 6–8.5)
PROT UR QL STRIP: ABNORMAL
RBC # BLD AUTO: 4.53 10*6/MM3 (ref 3.77–5.28)
SODIUM SERPL-SCNC: 141 MMOL/L (ref 136–145)
SP GR UR STRIP: 1.02 (ref 1–1.03)
UROBILINOGEN UR QL STRIP: ABNORMAL
WBC NRBC COR # BLD: 7.77 10*3/MM3 (ref 3.4–10.8)
WHOLE BLOOD HOLD COAG: NORMAL
WHOLE BLOOD HOLD SPECIMEN: NORMAL

## 2023-11-01 PROCEDURE — 99284 EMERGENCY DEPT VISIT MOD MDM: CPT

## 2023-11-01 PROCEDURE — 80053 COMPREHEN METABOLIC PANEL: CPT | Performed by: STUDENT IN AN ORGANIZED HEALTH CARE EDUCATION/TRAINING PROGRAM

## 2023-11-01 PROCEDURE — 25010000002 METOCLOPRAMIDE PER 10 MG: Performed by: STUDENT IN AN ORGANIZED HEALTH CARE EDUCATION/TRAINING PROGRAM

## 2023-11-01 PROCEDURE — 85025 COMPLETE CBC W/AUTO DIFF WBC: CPT | Performed by: STUDENT IN AN ORGANIZED HEALTH CARE EDUCATION/TRAINING PROGRAM

## 2023-11-01 PROCEDURE — 83605 ASSAY OF LACTIC ACID: CPT | Performed by: STUDENT IN AN ORGANIZED HEALTH CARE EDUCATION/TRAINING PROGRAM

## 2023-11-01 PROCEDURE — 96375 TX/PRO/DX INJ NEW DRUG ADDON: CPT

## 2023-11-01 PROCEDURE — 36415 COLL VENOUS BLD VENIPUNCTURE: CPT

## 2023-11-01 PROCEDURE — 74176 CT ABD & PELVIS W/O CONTRAST: CPT

## 2023-11-01 PROCEDURE — 25010000002 MORPHINE PER 10 MG: Performed by: STUDENT IN AN ORGANIZED HEALTH CARE EDUCATION/TRAINING PROGRAM

## 2023-11-01 PROCEDURE — 96374 THER/PROPH/DIAG INJ IV PUSH: CPT

## 2023-11-01 PROCEDURE — 25810000003 SODIUM CHLORIDE 0.9 % SOLUTION: Performed by: STUDENT IN AN ORGANIZED HEALTH CARE EDUCATION/TRAINING PROGRAM

## 2023-11-01 PROCEDURE — 83690 ASSAY OF LIPASE: CPT | Performed by: STUDENT IN AN ORGANIZED HEALTH CARE EDUCATION/TRAINING PROGRAM

## 2023-11-01 PROCEDURE — 81003 URINALYSIS AUTO W/O SCOPE: CPT | Performed by: STUDENT IN AN ORGANIZED HEALTH CARE EDUCATION/TRAINING PROGRAM

## 2023-11-01 RX ORDER — KETOROLAC TROMETHAMINE 30 MG/ML
30 INJECTION, SOLUTION INTRAMUSCULAR; INTRAVENOUS ONCE
Status: DISCONTINUED | OUTPATIENT
Start: 2023-11-01 | End: 2023-11-01

## 2023-11-01 RX ORDER — ONDANSETRON 2 MG/ML
4 INJECTION INTRAMUSCULAR; INTRAVENOUS ONCE
Status: DISCONTINUED | OUTPATIENT
Start: 2023-11-01 | End: 2023-11-01

## 2023-11-01 RX ORDER — METOCLOPRAMIDE HYDROCHLORIDE 5 MG/ML
5 INJECTION INTRAMUSCULAR; INTRAVENOUS ONCE
Status: COMPLETED | OUTPATIENT
Start: 2023-11-01 | End: 2023-11-01

## 2023-11-01 RX ORDER — SODIUM CHLORIDE 0.9 % (FLUSH) 0.9 %
10 SYRINGE (ML) INJECTION AS NEEDED
Status: DISCONTINUED | OUTPATIENT
Start: 2023-11-01 | End: 2023-11-01 | Stop reason: HOSPADM

## 2023-11-01 RX ADMIN — MORPHINE SULFATE 4 MG: 4 INJECTION, SOLUTION INTRAMUSCULAR; INTRAVENOUS at 08:24

## 2023-11-01 RX ADMIN — METOCLOPRAMIDE 5 MG: 5 INJECTION, SOLUTION INTRAMUSCULAR; INTRAVENOUS at 08:24

## 2023-11-01 RX ADMIN — SODIUM CHLORIDE 1000 ML: 9 INJECTION, SOLUTION INTRAVENOUS at 08:24

## 2023-11-01 NOTE — ED PROVIDER NOTES
Subjective   History of Present Illness  67-year-old female with past medical history of hyperlipidemia and diabetes presents to the ER due to concerns for right-sided flank pain.  Patient noted symptoms been present for the last 1 to 2 days.  Patient was counseled to report to the ER by her PCP.  PCP initially treated the patient for concerns for a right-sided kidney stone.  Patient received Toradol and Zofran in the outpatient setting.  No symptom improvement.  Vital stable.  Afebrile      Review of Systems   Genitourinary:  Positive for flank pain.   Musculoskeletal:  Positive for back pain.   All other systems reviewed and are negative.      Past Medical History:   Diagnosis Date    Anemia 2023    Asthma     Chacon esophagus 2014    COVID-19 12/2022    Cyst of thyroid     Diabetes mellitus     Elevated cholesterol     Fatty liver     GERD without esophagitis     Hypertension     Hypothyroidism     Irritable bowel syndrome 1989    PONV (postoperative nausea and vomiting)     Sleep apnea     CPAP       Allergies   Allergen Reactions    Erythromycin GI Intolerance       Past Surgical History:   Procedure Laterality Date    CHOLECYSTECTOMY  1999    COLONOSCOPY  2014    COLONOSCOPY N/A 6/13/2023    Procedure: COLONOSCOPY;  Surgeon: Jose L Neff MD;  Location:  [facility];  Service: Gastroenterology;  Laterality: N/A;    ENDOSCOPY N/A 6/13/2023    Procedure: ESOPHAGOGASTRODUODENOSCOPY;  Surgeon: Jose L Neff MD;  Location: [facility];  Service: Gastroenterology;  Laterality: N/A;    GALLBLADDER SURGERY      HYSTERECTOMY  1989    PARTIAL    TOOTH EXTRACTION      TOTAL ABDOMINAL HYSTERECTOMY      EARLY 1900'S    UPPER GASTROINTESTINAL ENDOSCOPY  2020       Family History   Problem Relation Age of Onset    Dementia Mother     Heart disease Father     Arthritis Brother     Colon cancer Paternal Aunt     Breast cancer Neg Hx        Social History     Socioeconomic History    Marital status:     Tobacco Use    Smoking status: Never    Smokeless tobacco: Never   Vaping Use    Vaping Use: Never used   Substance and Sexual Activity    Alcohol use: No    Drug use: No    Sexual activity: Not Currently           Objective   Physical Exam  Constitutional:       General: She is not in acute distress.     Appearance: Normal appearance. She is not ill-appearing.   HENT:      Head: Normocephalic and atraumatic.      Right Ear: External ear normal.      Left Ear: External ear normal.      Nose: Nose normal.      Mouth/Throat:      Mouth: Mucous membranes are moist.   Eyes:      Extraocular Movements: Extraocular movements intact.      Pupils: Pupils are equal, round, and reactive to light.   Cardiovascular:      Rate and Rhythm: Normal rate and regular rhythm.      Heart sounds: No murmur heard.  Pulmonary:      Effort: Pulmonary effort is normal. No respiratory distress.      Breath sounds: Normal breath sounds. No wheezing.   Abdominal:      General: Bowel sounds are normal.      Palpations: Abdomen is soft.      Tenderness: There is no abdominal tenderness.   Musculoskeletal:         General: No deformity or signs of injury. Normal range of motion.      Cervical back: Normal range of motion and neck supple.   Skin:     General: Skin is warm and dry.      Findings: No erythema.   Neurological:      General: No focal deficit present.      Mental Status: She is alert and oriented to person, place, and time. Mental status is at baseline.      Cranial Nerves: No cranial nerve deficit.   Psychiatric:         Mood and Affect: Mood normal.         Behavior: Behavior normal.         Thought Content: Thought content normal.         Procedures           ED Course      CT Abdomen Pelvis Without Contrast    Result Date: 11/1/2023  1.  Stable exam demonstrating no acute findings within abdomen or pelvis. 2.  Fatty infiltration of liver. 3.  No renal or ureteral stones. No obstructive uropathy. 4.  Prior hysterectomy and  cholecystectomy. 5.  Other nonacute findings above.   This report was finalized on 11/1/2023 10:19 AM by Dr. Joshua Larose MD.       Results for orders placed or performed during the hospital encounter of 11/01/23   Comprehensive Metabolic Panel    Specimen: Arm, Right; Blood   Result Value Ref Range    Glucose 136 (H) 65 - 99 mg/dL    BUN 7 (L) 8 - 23 mg/dL    Creatinine 0.65 0.57 - 1.00 mg/dL    Sodium 141 136 - 145 mmol/L    Potassium 3.5 3.5 - 5.2 mmol/L    Chloride 101 98 - 107 mmol/L    CO2 26.7 22.0 - 29.0 mmol/L    Calcium 9.8 8.6 - 10.5 mg/dL    Total Protein 7.4 6.0 - 8.5 g/dL    Albumin 4.4 3.5 - 5.2 g/dL    ALT (SGPT) 17 1 - 33 U/L    AST (SGOT) 22 1 - 32 U/L    Alkaline Phosphatase 95 39 - 117 U/L    Total Bilirubin 0.4 0.0 - 1.2 mg/dL    Globulin 3.0 gm/dL    A/G Ratio 1.5 g/dL    BUN/Creatinine Ratio 10.8 7.0 - 25.0    Anion Gap 13.3 5.0 - 15.0 mmol/L    eGFR 96.0 >60.0 mL/min/1.73   Lipase    Specimen: Arm, Right; Blood   Result Value Ref Range    Lipase 15 13 - 60 U/L   Urinalysis With Microscopic If Indicated (No Culture) - Urine, Clean Catch    Specimen: Urine, Clean Catch   Result Value Ref Range    Color, UA Dark Yellow (A) Yellow, Straw    Appearance, UA Clear Clear    pH, UA 5.5 5.0 - 8.0    Specific Gravity, UA 1.023 1.005 - 1.030    Glucose, UA Negative Negative    Ketones, UA Trace (A) Negative    Bilirubin, UA Negative Negative    Blood, UA Negative Negative    Protein, UA Trace (A) Negative    Leuk Esterase, UA Negative Negative    Nitrite, UA Negative Negative    Urobilinogen, UA 1.0 E.U./dL 0.2 - 1.0 E.U./dL   Lactic Acid, Plasma    Specimen: Arm, Right; Blood   Result Value Ref Range    Lactate 1.8 0.5 - 2.0 mmol/L   CBC Auto Differential    Specimen: Arm, Right; Blood   Result Value Ref Range    WBC 7.77 3.40 - 10.80 10*3/mm3    RBC 4.53 3.77 - 5.28 10*6/mm3    Hemoglobin 11.9 (L) 12.0 - 15.9 g/dL    Hematocrit 39.1 34.0 - 46.6 %    MCV 86.3 79.0 - 97.0 fL    MCH 26.3 (L) 26.6 - 33.0  pg    MCHC 30.4 (L) 31.5 - 35.7 g/dL    RDW 17.2 (H) 12.3 - 15.4 %    RDW-SD 54.9 (H) 37.0 - 54.0 fl    MPV 8.9 6.0 - 12.0 fL    Platelets 331 140 - 450 10*3/mm3    Neutrophil % 81.2 (H) 42.7 - 76.0 %    Lymphocyte % 11.8 (L) 19.6 - 45.3 %    Monocyte % 5.8 5.0 - 12.0 %    Eosinophil % 0.4 0.3 - 6.2 %    Basophil % 0.4 0.0 - 1.5 %    Immature Grans % 0.4 0.0 - 0.5 %    Neutrophils, Absolute 6.31 1.70 - 7.00 10*3/mm3    Lymphocytes, Absolute 0.92 0.70 - 3.10 10*3/mm3    Monocytes, Absolute 0.45 0.10 - 0.90 10*3/mm3    Eosinophils, Absolute 0.03 0.00 - 0.40 10*3/mm3    Basophils, Absolute 0.03 0.00 - 0.20 10*3/mm3    Immature Grans, Absolute 0.03 0.00 - 0.05 10*3/mm3    nRBC 0.0 0.0 - 0.2 /100 WBC   Green Top (Gel)   Result Value Ref Range    Extra Tube Hold for add-ons.    Lavender Top   Result Value Ref Range    Extra Tube hold for add-on    Gold Top - SST   Result Value Ref Range    Extra Tube Hold for add-ons.    Light Blue Top   Result Value Ref Range    Extra Tube Hold for add-ons.                                           Medical Decision Making  CBC and CMP are unremarkable.  Urinalysis unremarkable.  CT imaging of the abdomen pelvis also notes no acute abnormality.  No obvious signs for nephro or ureterolithiasis.  Patient's source may be musculoskeletal in nature with radiculopathy from the lumbar spine.  Patient counseled on these findings.  Recommend outpatient MRI for further work-up.  Work up and results were discussed throughly with the patient.  The patient will be discharged for further monitoring and management with their PCP.  Red flags, warning signs, worsening symptoms, and when to return to the ER discussed with and understood by the patient.  Patient will follow up with their PCP in a timely manner.  Vitals stable at discharge.    Amount and/or Complexity of Data Reviewed  Labs: ordered. Decision-making details documented in ED Course.  Radiology: ordered. Decision-making details documented in  ED Course.    Risk  Prescription drug management.        Final diagnoses:   Acute right-sided low back pain, unspecified whether sciatica present       ED Disposition  ED Disposition       ED Disposition   Discharge    Condition   Stable    Comment   --               Beau Brasher MD  [address] [facility] [address]  [phone]    In 1 week      [facility] EMERGENCY DEPARTMENT  [address] [place] [id number] [phone]    If symptoms worsen         Medication List      No changes were made to your prescriptions during this visit.            Fransisco Jennings,   11/01/23 1035

## 2023-11-09 ENCOUNTER — PATIENT OUTREACH (OUTPATIENT)
Dept: CASE MANAGEMENT | Facility: OTHER | Age: 68
End: 2023-11-09
Payer: MEDICARE

## 2023-11-09 NOTE — OUTREACH NOTE
AMBULATORY CASE MANAGEMENT NOTE    Name and Relationship of Patient/Support Person: Yoly Ashely H - Self    Patient Outreach    Patient was seen in ED 11/01/23 for low back pain; dx'd with acute right-sided low back pain. Patient reports feeling much better today, states she is just glad to know it is not a kidney stone. Pt states she has an appointment to f/u with PCP for pain mgmt., states she doesn't remember any exacerbating injury that would account for the increase in back pain. Education provided, understanding voiced. Pt denies any further health concerns or questions, states she appreciates the outreach and support but feels like she is doing very well with her blood pressure and overall health management; states further calls are not needed; acknowledges having ACM contact information if further assistance from HRCM is desired in the future. ACM will begin monitoring to close program for this episode.    Education Documentation  Prevention Measures, taught by Katelynn Rahman, RN at 11/9/2023 10:52 AM.  Learner: Patient  Readiness: Acceptance  Method: Explanation  Response: Verbalizes Understanding    preventive care, taught by Katelynn Rahman, RN at 11/9/2023 10:52 AM.  Learner: Patient  Readiness: Acceptance  Method: Explanation  Response: Verbalizes Understanding    Unresolved/Worsening Symptoms, taught by Katelynn Rahman, RN at 11/9/2023 10:52 AM.  Learner: Patient  Readiness: Acceptance  Method: Explanation  Response: Verbalizes Understanding    Self-Care, taught by Katelynn Rahman, RN at 11/9/2023 10:52 AM.  Learner: Patient  Readiness: Acceptance  Method: Explanation  Response: Verbalizes Understanding    Provider Follow-Up, taught by Katelynn Rahman, RN at 11/9/2023 10:52 AM.  Learner: Patient  Readiness: Acceptance  Method: Explanation  Response: Verbalizes Understanding          Katelynn PACK  Ambulatory Case Management    11/9/2023, 10:52 EST

## 2024-02-15 ENCOUNTER — HOSPITAL ENCOUNTER (OUTPATIENT)
Facility: HOSPITAL | Age: 69
Discharge: HOME | End: 2024-02-15
Admitting: INTERNAL MEDICINE
Payer: MEDICARE

## 2024-02-15 ENCOUNTER — TRANSCRIBE ORDERS (OUTPATIENT)
Dept: ADMINISTRATIVE | Facility: HOSPITAL | Age: 69
End: 2024-02-15
Payer: MEDICARE

## 2024-02-15 DIAGNOSIS — R91.8 LUNG MASS: ICD-10-CM

## 2024-02-15 DIAGNOSIS — R91.8 LUNG MASS: Primary | ICD-10-CM

## 2024-02-15 PROCEDURE — 71250 CT THORAX DX C-: CPT | Performed by: RADIOLOGY

## 2024-02-15 PROCEDURE — 71250 CT THORAX DX C-: CPT

## 2024-02-16 ENCOUNTER — TRANSCRIBE ORDERS (OUTPATIENT)
Dept: ADMINISTRATIVE | Facility: HOSPITAL | Age: 69
End: 2024-02-16
Payer: MEDICARE

## 2024-02-16 DIAGNOSIS — R91.8 PULMONARY NODULES: Primary | ICD-10-CM

## 2024-03-11 ENCOUNTER — OFFICE VISIT (OUTPATIENT)
Dept: GASTROENTEROLOGY | Facility: CLINIC | Age: 69
End: 2024-03-11
Payer: MEDICARE

## 2024-03-11 VITALS
HEART RATE: 80 BPM | BODY MASS INDEX: 35.81 KG/M2 | DIASTOLIC BLOOD PRESSURE: 70 MMHG | HEIGHT: 60 IN | OXYGEN SATURATION: 96 % | TEMPERATURE: 96.8 F | SYSTOLIC BLOOD PRESSURE: 132 MMHG | WEIGHT: 182.4 LBS

## 2024-03-11 DIAGNOSIS — K58.0 IRRITABLE BOWEL SYNDROME WITH DIARRHEA: Primary | ICD-10-CM

## 2024-03-11 DIAGNOSIS — K22.70 BARRETT'S ESOPHAGUS WITHOUT DYSPLASIA: ICD-10-CM

## 2024-03-11 DIAGNOSIS — K21.9 GASTROESOPHAGEAL REFLUX DISEASE, UNSPECIFIED WHETHER ESOPHAGITIS PRESENT: ICD-10-CM

## 2024-03-11 PROCEDURE — 3075F SYST BP GE 130 - 139MM HG: CPT | Performed by: NURSE PRACTITIONER

## 2024-03-11 PROCEDURE — G2211 COMPLEX E/M VISIT ADD ON: HCPCS | Performed by: NURSE PRACTITIONER

## 2024-03-11 PROCEDURE — 1160F RVW MEDS BY RX/DR IN RCRD: CPT | Performed by: NURSE PRACTITIONER

## 2024-03-11 PROCEDURE — 3078F DIAST BP <80 MM HG: CPT | Performed by: NURSE PRACTITIONER

## 2024-03-11 PROCEDURE — 1159F MED LIST DOCD IN RCRD: CPT | Performed by: NURSE PRACTITIONER

## 2024-03-11 PROCEDURE — 99214 OFFICE O/P EST MOD 30 MIN: CPT | Performed by: NURSE PRACTITIONER

## 2024-03-11 RX ORDER — HYDROXYCHLOROQUINE SULFATE 200 MG/1
1 TABLET, FILM COATED ORAL 2 TIMES DAILY
COMMUNITY

## 2024-03-11 RX ORDER — LORAZEPAM 0.5 MG/1
0.5 TABLET ORAL 2 TIMES DAILY PRN
COMMUNITY

## 2024-03-11 RX ORDER — DEXLANSOPRAZOLE 60 MG/1
60 CAPSULE, DELAYED RELEASE ORAL DAILY
Qty: 90 CAPSULE | Refills: 3 | Status: SHIPPED | OUTPATIENT
Start: 2024-03-11

## 2024-03-11 NOTE — PROGRESS NOTES
"     Follow Up      Patient Name: Ashely Frederick  : 1955   MRN: [id number]     Chief Complaint:    Chief Complaint   Patient presents with    Follow-up    Bloated       History of Present Illness: Ashely Frederick is a 68 y.o. female who is here today for follow up on IBS, Chacon's      Ashely is here today to follow-up on her IBS and GERD/Chacon's  She had been doing well until the last 1 to 2 weeks.      For her GERD/Chacon's (dx )-she is compliant with her Dexilant.  There is no breakthrough heartburn.  She follows reflux precautions.  Occasionally a pill will turn sideways in her esophagus and get stuck but there is no other dysphagia.    IBS-does well for a while with Benefiber and align probiotic.  Typically has \"flares\" of her IBS about once a month.  During that time she will use Bentyl but she likes to avoid Bentyl as it causes a lot of fatigue.  For the past 1 week she has been having worsening gas, diarrhea, bloating.  She is having a poor appetite.  There is no fever.  She took a few antibiotics she had left around the house.  Xifaxan previously called in but this was too expensive.    Her abdominal surgical history include cassie and hysterectomy     UPPER GI ENDOSCOPY (2023 10:49) (Dr Neff)-esophageal mucosal changes suggestive of short segment Chacon's, small hiatal hernia, a single gastric polyp,-biopsies negative for H. pylori, biopsies positive for Chacon's   COLONOSCOPY (2023 10:49) (Dr Neff)- one 13 mm TA cecal polyp, 1 diminutive polyp in the sigmoid, one 3 mm TA polyp in the sigmoid-3-year recall  SCANNED - COLONOSCOPY (2021)-2014-cecal polyp, diverticulosis.  ENDOSCOPY, INT (2021)-moderate hiatal hernia, short segment Chacon's esophagus, multiple ulcerated gastric polyp      Subjective      Review of Systems:   Review of Systems   Constitutional:  Positive for appetite change. Negative for unexpected weight loss.   HENT:  Negative for trouble " swallowing.    Gastrointestinal:  Positive for abdominal distention, abdominal pain, diarrhea and indigestion. Negative for anal bleeding, blood in stool, constipation, nausea, rectal pain, vomiting and GERD.       Medications:     Current Outpatient Medications:     albuterol sulfate  (90 Base) MCG/ACT inhaler, Inhale 1 puff As Needed for Wheezing or Shortness of Air., Disp: , Rfl:     aspirin 81 MG EC tablet, Take 1 tablet by mouth Daily., Disp: , Rfl:     Bacillus Coagulans-Inulin (ALIGN PREBIOTIC-PROBIOTIC PO), Take 1 capsule by mouth Daily., Disp: , Rfl:     bisoprolol-hydrochlorothiazide (ZIAC) 5-6.25 MG per tablet, Take 1 tablet by mouth Daily., Disp: , Rfl:     cetirizine (zyrTEC) 10 MG tablet, Take 1 tablet by mouth Daily., Disp: , Rfl:     dexlansoprazole (DEXILANT) 60 MG capsule, Take 1 capsule by mouth Daily., Disp: 90 capsule, Rfl: 3    hydroxychloroquine (PLAQUENIL) 200 MG tablet, Take 1 tablet by mouth 2 (Two) Times a Day., Disp: , Rfl:     LORazepam (ATIVAN) 0.5 MG tablet, Take 1 tablet by mouth 2 (Two) Times a Day As Needed., Disp: , Rfl:     losartan (COZAAR) 50 MG tablet, Take 1 tablet by mouth Daily., Disp: , Rfl:     Magnesium 500 MG capsule, Take 500 mg by mouth Daily., Disp: , Rfl:     Restasis 0.05 % ophthalmic emulsion, Administer 1 drop to both eyes As Needed (DRY EYES)., Disp: , Rfl:     sertraline (ZOLOFT) 50 MG tablet, Take 1 tablet by mouth Daily., Disp: , Rfl:     simethicone (Gas-X) 80 MG chewable tablet, Chew 1 tablet Every 6 (Six) Hours As Needed for Flatulence., Disp: 14 tablet, Rfl: 0    Synthroid 50 MCG tablet, Take 1 tablet by mouth Daily., Disp: 90 tablet, Rfl: 3    TURMERIC PO, Take 1,000 mg by mouth Daily., Disp: , Rfl:     vitamin D3 125 MCG (5000 UT) capsule capsule, Take 1 capsule by mouth Daily., Disp: , Rfl:     riFAXIMin (XIFAXAN) 550 MG tablet, Take 1 tablet by mouth 3 (Three) Times a Day., Disp: 42 tablet, Rfl: 0    Allergies:   Allergies   Allergen Reactions  "   Erythromycin GI Intolerance       Social History:   Social History     Socioeconomic History    Marital status:    Tobacco Use    Smoking status: Never    Smokeless tobacco: Never   Vaping Use    Vaping status: Never Used   Substance and Sexual Activity    Alcohol use: No    Drug use: No    Sexual activity: Not Currently        Surgical History:   Past Surgical History:   Procedure Laterality Date    CHOLECYSTECTOMY  1999    COLONOSCOPY  2014    COLONOSCOPY N/A 6/13/2023    Procedure: COLONOSCOPY;  Surgeon: Jose L Neff MD;  Location:  [facility];  Service: Gastroenterology;  Laterality: N/A;    ENDOSCOPY N/A 6/13/2023    Procedure: ESOPHAGOGASTRODUODENOSCOPY;  Surgeon: Jose L Neff MD;  Location:  [facility];  Service: Gastroenterology;  Laterality: N/A;    GALLBLADDER SURGERY      HYSTERECTOMY  1989    PARTIAL    TOOTH EXTRACTION      TOTAL ABDOMINAL HYSTERECTOMY      EARLY 1900'S    UPPER GASTROINTESTINAL ENDOSCOPY  2020        Medical History:   Past Medical History:   Diagnosis Date    Anemia 2023    Asthma     Chacon esophagus 2014    COVID-19 12/2022    Cyst of thyroid     Diabetes mellitus     Elevated cholesterol     Fatty liver     GERD without esophagitis     Hypertension     Hypothyroidism     Irritable bowel syndrome 1989    PONV (postoperative nausea and vomiting)     Sleep apnea     CPAP        Objective     Physical Exam:  Vital Signs:   Vitals:    03/11/24 1034   BP: 132/70   Pulse: 80   Temp: 96.8 °F (36 °C)   SpO2: 96%   Weight: 82.7 kg (182 lb 6.4 oz)   Height: 152.4 cm (60\")     Body mass index is 35.62 kg/m².     Physical Exam  Vitals and nursing note reviewed.   Constitutional:       General: She is not in acute distress.     Appearance: She is well-developed. She is not diaphoretic.   Eyes:      General: No scleral icterus.     Conjunctiva/sclera: Conjunctivae normal.   Neck:      Thyroid: No thyromegaly.   Cardiovascular:      Rate and Rhythm: Normal rate " and regular rhythm.   Pulmonary:      Effort: Pulmonary effort is normal.      Breath sounds: Normal breath sounds.   Abdominal:      General: Bowel sounds are normal. There is no distension.      Palpations: Abdomen is soft.      Tenderness: There is no abdominal tenderness. There is no guarding or rebound.      Hernia: No hernia is present.   Musculoskeletal:      Cervical back: Neck supple.      Right lower leg: No edema.      Left lower leg: No edema.   Skin:     General: Skin is warm and dry.      Capillary Refill: Capillary refill takes 2 to 3 seconds.      Coloration: Skin is not jaundiced or pale.      Findings: No bruising or petechiae.      Nails: There is no clubbing.   Neurological:      Mental Status: She is alert and oriented to person, place, and time.   Psychiatric:         Behavior: Behavior normal.         Thought Content: Thought content normal.         Judgment: Judgment normal.         Assessment / Plan      Assessment/Plan:   Diagnoses and all orders for this visit:    1. Irritable bowel syndrome with diarrhea (Primary)  -     riFAXIMin (XIFAXAN) 550 MG tablet; Take 1 tablet by mouth 3 (Three) Times a Day.  Dispense: 42 tablet; Refill: 0  Samples given today of IBgard, low FODMAP diet discussed.    See if we can get her a course of Xifaxan samples  2. Chacon's esophagus without dysplasia  -     dexlansoprazole (DEXILANT) 60 MG capsule; Take 1 capsule by mouth Daily.  Dispense: 90 capsule; Refill: 3  Continue PPI indefinitely. Continue reflux precautions  3. Gastroesophageal reflux disease, unspecified whether esophagitis present  -     dexlansoprazole (DEXILANT) 60 MG capsule; Take 1 capsule by mouth Daily.  Dispense: 90 capsule; Refill: 3         Follow Up:   Return in about 4 months (around 7/11/2024), or if symptoms worsen or fail to improve.    Plan of care reviewed with the patient at the conclusion of today's visit.  Education was provided regarding diagnosis, management, and any  prescribed or recommended OTC medications.  Patient verbalized understanding of and agreement with management plan.       CAITLIN Dominguez  [facility]

## 2024-04-03 ENCOUNTER — TELEPHONE (OUTPATIENT)
Dept: GASTROENTEROLOGY | Facility: CLINIC | Age: 69
End: 2024-04-03
Payer: MEDICARE

## 2024-04-03 DIAGNOSIS — R10.31 RLQ ABDOMINAL PAIN: Primary | ICD-10-CM

## 2024-04-03 RX ORDER — DICYCLOMINE HYDROCHLORIDE 10 MG/1
10 CAPSULE ORAL 3 TIMES DAILY PRN
Qty: 90 CAPSULE | Refills: 2 | Status: SHIPPED | OUTPATIENT
Start: 2024-04-03

## 2024-04-03 NOTE — TELEPHONE ENCOUNTER
I called patient she states she completed the course of xifaxan, no fever, she does have chills, having pain in RLQ.   Please advise?

## 2024-04-03 NOTE — TELEPHONE ENCOUNTER
Provider: CAITLIN BIRMINGHAM    Caller: LISA MARTIN    Relationship to Patient: SELF    Phone Number: [phone]    Reason for Call: PT CALLED STATING THAT FOR THE PAST WEEK, SHE HAS HAD AN IBS FLARE-UP// PT STATES THAT THIS FLARE-UP FEELS MORE SEVERE THAN THE PAST ONES AND THEY'RE HAPPENING MORE OFTEN SINCE SHE'S STOPPED TAKING HER XIFAXIN// PT STATES THAT SHE IS HAVING PAIN IN THE LOWER RIGHT PART OF HER ABDOMEN// PT WOULD LIKE ADVICE ON WHAT SHE SHOULD DO// PLEASE CALL PT BACK

## 2024-04-04 ENCOUNTER — TELEPHONE (OUTPATIENT)
Dept: GASTROENTEROLOGY | Facility: CLINIC | Age: 69
End: 2024-04-04
Payer: MEDICARE

## 2024-04-04 NOTE — TELEPHONE ENCOUNTER
I spoke with Ms Frederick. Patient stated she will let [facility] scheduling  that she wants to schedule with Pablo Giordano.

## 2024-04-04 NOTE — TELEPHONE ENCOUNTER
Caller: Ashely Frederick    Relationship: Self    Best call back number:  [phone]    What is the best time to reach you: ANY    Who are you requesting to speak with (clinical staff, provider,  specific staff member): CLINICAL    Do you know the name of the person who called: NO    What was the call regarding:  PT HAS QUESTIONS REGARDING CT SCAN, WOULD LIKE IT TO BE LOCAL.     Is it okay if the provider responds through [facility]: NO, WOULD LIKE A CALL BACK. OK TO LEAVE DETAILED MESSAGE ON VM.

## 2024-04-09 ENCOUNTER — HOSPITAL ENCOUNTER (OUTPATIENT)
Facility: HOSPITAL | Age: 69
Discharge: HOME | End: 2024-04-09
Admitting: NURSE PRACTITIONER
Payer: MEDICARE

## 2024-04-09 DIAGNOSIS — R10.31 RLQ ABDOMINAL PAIN: ICD-10-CM

## 2024-04-09 LAB — CREAT BLDA-MCNC: 0.6 MG/DL (ref 0.6–1.3)

## 2024-04-09 PROCEDURE — 82565 ASSAY OF CREATININE: CPT

## 2024-04-09 PROCEDURE — 74178 CT ABD&PLV WO CNTR FLWD CNTR: CPT

## 2024-04-09 PROCEDURE — 25510000001 IOPAMIDOL 61 % SOLUTION: Performed by: NURSE PRACTITIONER

## 2024-04-09 RX ADMIN — IOPAMIDOL 100 ML: 612 INJECTION, SOLUTION INTRAVENOUS at 11:25

## 2024-08-16 RX ORDER — LEVOTHYROXINE SODIUM 50 MCG
50 TABLET ORAL DAILY
Qty: 90 TABLET | Refills: 3 | Status: SHIPPED | OUTPATIENT
Start: 2024-08-16

## 2024-10-10 ENCOUNTER — OFFICE VISIT (OUTPATIENT)
Dept: ENDOCRINOLOGY | Facility: CLINIC | Age: 69
End: 2024-10-10
Payer: MEDICARE

## 2024-10-10 VITALS
DIASTOLIC BLOOD PRESSURE: 60 MMHG | HEART RATE: 64 BPM | SYSTOLIC BLOOD PRESSURE: 120 MMHG | WEIGHT: 171.6 LBS | BODY MASS INDEX: 33.51 KG/M2 | OXYGEN SATURATION: 97 %

## 2024-10-10 DIAGNOSIS — E03.9 ACQUIRED HYPOTHYROIDISM: Primary | ICD-10-CM

## 2024-10-10 PROCEDURE — 99213 OFFICE O/P EST LOW 20 MIN: CPT | Performed by: INTERNAL MEDICINE

## 2024-10-10 PROCEDURE — 1159F MED LIST DOCD IN RCRD: CPT | Performed by: INTERNAL MEDICINE

## 2024-10-10 PROCEDURE — 84443 ASSAY THYROID STIM HORMONE: CPT | Performed by: INTERNAL MEDICINE

## 2024-10-10 PROCEDURE — 3078F DIAST BP <80 MM HG: CPT | Performed by: INTERNAL MEDICINE

## 2024-10-10 PROCEDURE — 1160F RVW MEDS BY RX/DR IN RCRD: CPT | Performed by: INTERNAL MEDICINE

## 2024-10-10 PROCEDURE — 3074F SYST BP LT 130 MM HG: CPT | Performed by: INTERNAL MEDICINE

## 2024-10-10 NOTE — PROGRESS NOTES
Office Note      Date: 10/10/2024  Patient Name: Ashely Frederick  MRN: [id number]  : 1955    Chief Complaint   Patient presents with    thyroid follow up       History of Present Illness:   Ashely Frederick is a 68 y.o. female who presents for thyroid follow up  .   Current rx: synthroid 50 mcg per day     Changes in history: on new meds from rheumatology   Questions /problems: none    Subjective          Review of Systems:   Review of Systems   Constitutional:  Positive for appetite change, diaphoresis and unexpected weight change.   HENT:  Negative for trouble swallowing and voice change.    Cardiovascular:  Negative for palpitations.   Endocrine: Positive for heat intolerance.   Neurological:  Negative for tremors.   Psychiatric/Behavioral:  Positive for sleep disturbance.        The following portions of the patient's history were reviewed and updated as appropriate: allergies, current medications, past family history, past medical history, past social history, past surgical history, and problem list.    Objective     Visit Vitals  /60 (BP Location: Right arm, Patient Position: Sitting, Cuff Size: Adult)   Pulse 64   Wt 77.8 kg (171 lb 9.6 oz)   SpO2 97%   BMI 33.51 kg/m²           Physical Exam:  Physical Exam  Vitals reviewed.   Constitutional:       General: She is not in acute distress.     Appearance: She is not ill-appearing, toxic-appearing or diaphoretic.   Neck:      Comments: No goiter   Lymphadenopathy:      Cervical: No cervical adenopathy.   Psychiatric:         Mood and Affect: Mood normal.         Thought Content: Thought content normal.         Judgment: Judgment normal.         Assessment / Plan      Assessment & Plan:  Problem List Items Addressed This Visit       Acquired hypothyroidism - Primary    Current Assessment & Plan     Clinically euthyroid. Thyroid levels ordered. Medication to be adjusted accordingly.          Relevant Medications    Synthroid 50 MCG tablet         Electronically signed by : Familia Grover MD   10/10/2024

## 2024-10-11 LAB — TSH SERPL DL<=0.05 MIU/L-ACNC: 2.31 UIU/ML (ref 0.27–4.2)

## 2024-10-16 ENCOUNTER — HOSPITAL ENCOUNTER (OUTPATIENT)
Dept: MAMMOGRAPHY | Facility: HOSPITAL | Age: 69
Discharge: HOME | End: 2024-10-16
Admitting: INTERNAL MEDICINE
Payer: MEDICARE

## 2024-10-16 DIAGNOSIS — Z12.31 VISIT FOR SCREENING MAMMOGRAM: ICD-10-CM

## 2024-10-16 PROCEDURE — 77067 SCR MAMMO BI INCL CAD: CPT

## 2024-10-16 PROCEDURE — 77063 BREAST TOMOSYNTHESIS BI: CPT

## (undated) DEVICE — THE BITE BLOCK MAXI, LATEX FREE STRAP IS USED TO PROTECT THE ENDOSCOPE INSERTION TUBE FROM BEING BITTEN BY THE PATIENT.

## (undated) DEVICE — FIRST STEP BEDSIDE ADD WATER KIT - RESEALABLE STAND-UP POUCH, ENDOSCOPIC CLEANING PAD - 1 POUCH: Brand: FIRST STEP BEDSIDE ADD WATER KIT - RESEALABLE STAND-UP POUCH, ENDOSCOPIC CLEANIN

## (undated) DEVICE — THE SINGLE USE ETRAP – POLYP TRAP IS USED FOR SUCTION RETRIEVAL OF ENDOSCOPICALLY REMOVED POLYPS.: Brand: ETRAP

## (undated) DEVICE — LUBE GEL ENDOGLIDE 1.1OZ

## (undated) DEVICE — HYBRID CO2 TUBING/CAP SET FOR OLYMPUS® SCOPES & CO2 SOURCE: Brand: ERBE

## (undated) DEVICE — TUBING, SUCTION, 1/4" X 10', STRAIGHT: Brand: MEDLINE

## (undated) DEVICE — SNAR POLYP CAPTIVATOR MICROHEX 13 240CM

## (undated) DEVICE — Device

## (undated) DEVICE — ADAPT CLN LUM OLYMP AIR/H20

## (undated) DEVICE — SOLIDIFIER LIQ PREMISORB 1500CC

## (undated) DEVICE — LUBE JELLY FOIL PACK 1.4 OZ: Brand: MEDLINE INDUSTRIES, INC.

## (undated) DEVICE — SOL IRR H2O BTL 1000ML STRL

## (undated) DEVICE — INTRO ACCSR BLNT TP

## (undated) DEVICE — CONTN GRAD MEAS TRIANG 32OZ BLK

## (undated) DEVICE — KT ORCA ORCAPOD DISP STRL

## (undated) DEVICE — SYR LUERLOK 50ML

## (undated) DEVICE — SAFELINER SUCTION CANISTER 1000CC: Brand: DEROYAL